# Patient Record
Sex: FEMALE | Race: WHITE | Employment: OTHER | URBAN - METROPOLITAN AREA
[De-identification: names, ages, dates, MRNs, and addresses within clinical notes are randomized per-mention and may not be internally consistent; named-entity substitution may affect disease eponyms.]

---

## 2019-01-23 ENCOUNTER — OFFICE VISIT (OUTPATIENT)
Dept: FAMILY MEDICINE CLINIC | Age: 34
End: 2019-01-23

## 2019-01-23 VITALS
WEIGHT: 273.6 LBS | OXYGEN SATURATION: 97 % | TEMPERATURE: 98.4 F | HEIGHT: 64 IN | SYSTOLIC BLOOD PRESSURE: 136 MMHG | BODY MASS INDEX: 46.71 KG/M2 | DIASTOLIC BLOOD PRESSURE: 84 MMHG | HEART RATE: 105 BPM

## 2019-01-23 DIAGNOSIS — Z76.89 ESTABLISHING CARE WITH NEW DOCTOR, ENCOUNTER FOR: ICD-10-CM

## 2019-01-23 DIAGNOSIS — M25.561 CHRONIC PAIN OF RIGHT KNEE: Primary | ICD-10-CM

## 2019-01-23 DIAGNOSIS — Z13.220 SCREENING, LIPID: ICD-10-CM

## 2019-01-23 DIAGNOSIS — E66.01 CLASS 3 SEVERE OBESITY DUE TO EXCESS CALORIES WITHOUT SERIOUS COMORBIDITY WITH BODY MASS INDEX (BMI) OF 45.0 TO 49.9 IN ADULT (HCC): ICD-10-CM

## 2019-01-23 DIAGNOSIS — Z13.31 POSITIVE DEPRESSION SCREENING: ICD-10-CM

## 2019-01-23 DIAGNOSIS — R20.0 FINGER NUMBNESS: ICD-10-CM

## 2019-01-23 DIAGNOSIS — R51.9 NONINTRACTABLE HEADACHE, UNSPECIFIED CHRONICITY PATTERN, UNSPECIFIED HEADACHE TYPE: ICD-10-CM

## 2019-01-23 DIAGNOSIS — G89.29 CHRONIC PAIN OF RIGHT KNEE: Primary | ICD-10-CM

## 2019-01-23 DIAGNOSIS — Z80.0 FAMILY HISTORY OF COLON CANCER: ICD-10-CM

## 2019-01-23 DIAGNOSIS — Z82.49 FAMILY HISTORY OF EARLY CAD: ICD-10-CM

## 2019-01-23 DIAGNOSIS — F32.9 CURRENT EPISODE OF MAJOR DEPRESSIVE DISORDER WITHOUT PRIOR EPISODE, UNSPECIFIED DEPRESSION EPISODE SEVERITY: ICD-10-CM

## 2019-01-23 DIAGNOSIS — F17.200 SMOKER: ICD-10-CM

## 2019-01-23 LAB
A/G RATIO: 1.7 (ref 1.1–2.2)
ALBUMIN SERPL-MCNC: 4.5 G/DL (ref 3.4–5)
ALP BLD-CCNC: 63 U/L (ref 40–129)
ALT SERPL-CCNC: 24 U/L (ref 10–40)
ANION GAP SERPL CALCULATED.3IONS-SCNC: 17 MMOL/L (ref 3–16)
AST SERPL-CCNC: 17 U/L (ref 15–37)
BASOPHILS ABSOLUTE: 0.1 K/UL (ref 0–0.2)
BASOPHILS RELATIVE PERCENT: 0.6 %
BILIRUB SERPL-MCNC: <0.2 MG/DL (ref 0–1)
BUN BLDV-MCNC: 10 MG/DL (ref 7–20)
CALCIUM SERPL-MCNC: 9.4 MG/DL (ref 8.3–10.6)
CHLORIDE BLD-SCNC: 103 MMOL/L (ref 99–110)
CHOLESTEROL, TOTAL: 234 MG/DL (ref 0–199)
CO2: 22 MMOL/L (ref 21–32)
CREAT SERPL-MCNC: 0.7 MG/DL (ref 0.6–1.1)
EOSINOPHILS ABSOLUTE: 0.3 K/UL (ref 0–0.6)
EOSINOPHILS RELATIVE PERCENT: 3.1 %
FOLATE: 12.93 NG/ML (ref 4.78–24.2)
GFR AFRICAN AMERICAN: >60
GFR NON-AFRICAN AMERICAN: >60
GLOBULIN: 2.6 G/DL
GLUCOSE BLD-MCNC: 125 MG/DL (ref 70–99)
HCT VFR BLD CALC: 45.2 % (ref 36–48)
HDLC SERPL-MCNC: 38 MG/DL (ref 40–60)
HEMOGLOBIN: 15.2 G/DL (ref 12–16)
LDL CHOLESTEROL CALCULATED: 172 MG/DL
LYMPHOCYTES ABSOLUTE: 2.6 K/UL (ref 1–5.1)
LYMPHOCYTES RELATIVE PERCENT: 29 %
MCH RBC QN AUTO: 31.8 PG (ref 26–34)
MCHC RBC AUTO-ENTMCNC: 33.7 G/DL (ref 31–36)
MCV RBC AUTO: 94.6 FL (ref 80–100)
MONOCYTES ABSOLUTE: 0.7 K/UL (ref 0–1.3)
MONOCYTES RELATIVE PERCENT: 7.9 %
NEUTROPHILS ABSOLUTE: 5.3 K/UL (ref 1.7–7.7)
NEUTROPHILS RELATIVE PERCENT: 59.4 %
PDW BLD-RTO: 14.2 % (ref 12.4–15.4)
PLATELET # BLD: 311 K/UL (ref 135–450)
PMV BLD AUTO: 8.6 FL (ref 5–10.5)
POTASSIUM SERPL-SCNC: 3.8 MMOL/L (ref 3.5–5.1)
RBC # BLD: 4.78 M/UL (ref 4–5.2)
SODIUM BLD-SCNC: 142 MMOL/L (ref 136–145)
TOTAL PROTEIN: 7.1 G/DL (ref 6.4–8.2)
TRIGL SERPL-MCNC: 118 MG/DL (ref 0–150)
TSH REFLEX: 0.7 UIU/ML (ref 0.27–4.2)
VITAMIN B-12: 720 PG/ML (ref 211–911)
VLDLC SERPL CALC-MCNC: 24 MG/DL
WBC # BLD: 9 K/UL (ref 4–11)

## 2019-01-23 PROCEDURE — 99204 OFFICE O/P NEW MOD 45 MIN: CPT | Performed by: FAMILY MEDICINE

## 2019-01-23 PROCEDURE — G8431 POS CLIN DEPRES SCRN F/U DOC: HCPCS | Performed by: FAMILY MEDICINE

## 2019-01-23 PROCEDURE — G0444 DEPRESSION SCREEN ANNUAL: HCPCS | Performed by: FAMILY MEDICINE

## 2019-01-23 PROCEDURE — 36415 COLL VENOUS BLD VENIPUNCTURE: CPT | Performed by: FAMILY MEDICINE

## 2019-01-23 RX ORDER — AMITRIPTYLINE HYDROCHLORIDE 25 MG/1
25 TABLET, FILM COATED ORAL NIGHTLY
Qty: 30 TABLET | Refills: 0 | Status: SHIPPED | OUTPATIENT
Start: 2019-01-23 | End: 2020-09-13

## 2019-01-23 RX ORDER — FLUOXETINE HYDROCHLORIDE 20 MG/1
20 CAPSULE ORAL DAILY
Qty: 30 CAPSULE | Refills: 0 | Status: SHIPPED | OUTPATIENT
Start: 2019-01-23 | End: 2019-01-27 | Stop reason: SDUPTHER

## 2019-01-23 ASSESSMENT — PATIENT HEALTH QUESTIONNAIRE - PHQ9
SUM OF ALL RESPONSES TO PHQ QUESTIONS 1-9: 16
9. THOUGHTS THAT YOU WOULD BE BETTER OFF DEAD, OR OF HURTING YOURSELF: 0
7. TROUBLE CONCENTRATING ON THINGS, SUCH AS READING THE NEWSPAPER OR WATCHING TELEVISION: 2
SUM OF ALL RESPONSES TO PHQ QUESTIONS 1-9: 16
4. FEELING TIRED OR HAVING LITTLE ENERGY: 2
6. FEELING BAD ABOUT YOURSELF - OR THAT YOU ARE A FAILURE OR HAVE LET YOURSELF OR YOUR FAMILY DOWN: 2
1. LITTLE INTEREST OR PLEASURE IN DOING THINGS: 2
SUM OF ALL RESPONSES TO PHQ9 QUESTIONS 1 & 2: 4
5. POOR APPETITE OR OVEREATING: 2
2. FEELING DOWN, DEPRESSED OR HOPELESS: 2
10. IF YOU CHECKED OFF ANY PROBLEMS, HOW DIFFICULT HAVE THESE PROBLEMS MADE IT FOR YOU TO DO YOUR WORK, TAKE CARE OF THINGS AT HOME, OR GET ALONG WITH OTHER PEOPLE: 1
3. TROUBLE FALLING OR STAYING ASLEEP: 2
8. MOVING OR SPEAKING SO SLOWLY THAT OTHER PEOPLE COULD HAVE NOTICED. OR THE OPPOSITE, BEING SO FIGETY OR RESTLESS THAT YOU HAVE BEEN MOVING AROUND A LOT MORE THAN USUAL: 2

## 2019-01-23 ASSESSMENT — ENCOUNTER SYMPTOMS
CONSTIPATION: 0
DIARRHEA: 0
CHEST TIGHTNESS: 1
SHORTNESS OF BREATH: 1
BLOOD IN STOOL: 0

## 2019-01-27 DIAGNOSIS — F32.9 CURRENT EPISODE OF MAJOR DEPRESSIVE DISORDER WITHOUT PRIOR EPISODE, UNSPECIFIED DEPRESSION EPISODE SEVERITY: ICD-10-CM

## 2019-01-27 RX ORDER — FLUOXETINE HYDROCHLORIDE 20 MG/1
20 CAPSULE ORAL DAILY
Qty: 30 CAPSULE | Refills: 0 | Status: SHIPPED | OUTPATIENT
Start: 2019-01-27 | End: 2019-02-06 | Stop reason: SDUPTHER

## 2019-02-06 ENCOUNTER — OFFICE VISIT (OUTPATIENT)
Dept: FAMILY MEDICINE CLINIC | Age: 34
End: 2019-02-06
Payer: MEDICAID

## 2019-02-06 VITALS
TEMPERATURE: 97.2 F | DIASTOLIC BLOOD PRESSURE: 82 MMHG | SYSTOLIC BLOOD PRESSURE: 134 MMHG | HEART RATE: 88 BPM | BODY MASS INDEX: 46.14 KG/M2 | WEIGHT: 268.8 LBS | OXYGEN SATURATION: 98 %

## 2019-02-06 DIAGNOSIS — R73.9 HYPERGLYCEMIA: Primary | ICD-10-CM

## 2019-02-06 DIAGNOSIS — R51.9 NONINTRACTABLE HEADACHE, UNSPECIFIED CHRONICITY PATTERN, UNSPECIFIED HEADACHE TYPE: ICD-10-CM

## 2019-02-06 DIAGNOSIS — Z82.49 FAMILY HISTORY OF EARLY CAD: ICD-10-CM

## 2019-02-06 DIAGNOSIS — E78.2 MIXED HYPERLIPIDEMIA: ICD-10-CM

## 2019-02-06 DIAGNOSIS — F32.9 CURRENT EPISODE OF MAJOR DEPRESSIVE DISORDER WITHOUT PRIOR EPISODE, UNSPECIFIED DEPRESSION EPISODE SEVERITY: ICD-10-CM

## 2019-02-06 LAB — HBA1C MFR BLD: 5.2 %

## 2019-02-06 PROCEDURE — G8417 CALC BMI ABV UP PARAM F/U: HCPCS | Performed by: FAMILY MEDICINE

## 2019-02-06 PROCEDURE — 99214 OFFICE O/P EST MOD 30 MIN: CPT | Performed by: FAMILY MEDICINE

## 2019-02-06 PROCEDURE — 83036 HEMOGLOBIN GLYCOSYLATED A1C: CPT | Performed by: FAMILY MEDICINE

## 2019-02-06 PROCEDURE — G8427 DOCREV CUR MEDS BY ELIG CLIN: HCPCS | Performed by: FAMILY MEDICINE

## 2019-02-06 PROCEDURE — 4004F PT TOBACCO SCREEN RCVD TLK: CPT | Performed by: FAMILY MEDICINE

## 2019-02-06 PROCEDURE — G8484 FLU IMMUNIZE NO ADMIN: HCPCS | Performed by: FAMILY MEDICINE

## 2019-02-06 RX ORDER — FLUOXETINE HYDROCHLORIDE 20 MG/1
20 CAPSULE ORAL DAILY
Qty: 30 CAPSULE | Refills: 0 | Status: SHIPPED | OUTPATIENT
Start: 2019-02-06 | End: 2019-03-22 | Stop reason: SDUPTHER

## 2019-02-06 ASSESSMENT — ENCOUNTER SYMPTOMS: SHORTNESS OF BREATH: 0

## 2019-02-18 ENCOUNTER — TELEPHONE (OUTPATIENT)
Dept: GASTROENTEROLOGY | Age: 34
End: 2019-02-18

## 2019-03-12 ENCOUNTER — OFFICE VISIT (OUTPATIENT)
Dept: GASTROENTEROLOGY | Age: 34
End: 2019-03-12
Payer: MEDICAID

## 2019-03-12 VITALS
OXYGEN SATURATION: 97 % | HEART RATE: 84 BPM | HEIGHT: 64 IN | DIASTOLIC BLOOD PRESSURE: 72 MMHG | SYSTOLIC BLOOD PRESSURE: 112 MMHG | WEIGHT: 277 LBS | BODY MASS INDEX: 47.29 KG/M2

## 2019-03-12 DIAGNOSIS — Z80.0 FAMILY HISTORY OF COLON CANCER IN MOTHER: ICD-10-CM

## 2019-03-12 DIAGNOSIS — K92.1 BLOOD IN STOOL: Primary | ICD-10-CM

## 2019-03-12 PROCEDURE — 99204 OFFICE O/P NEW MOD 45 MIN: CPT | Performed by: NURSE PRACTITIONER

## 2019-03-12 PROCEDURE — G8427 DOCREV CUR MEDS BY ELIG CLIN: HCPCS | Performed by: NURSE PRACTITIONER

## 2019-03-12 PROCEDURE — G8417 CALC BMI ABV UP PARAM F/U: HCPCS | Performed by: NURSE PRACTITIONER

## 2019-03-12 PROCEDURE — G8484 FLU IMMUNIZE NO ADMIN: HCPCS | Performed by: NURSE PRACTITIONER

## 2019-03-12 PROCEDURE — 4004F PT TOBACCO SCREEN RCVD TLK: CPT | Performed by: NURSE PRACTITIONER

## 2019-03-12 ASSESSMENT — ENCOUNTER SYMPTOMS
WHEEZING: 0
NAUSEA: 0
COLOR CHANGE: 0
VOMITING: 0
ABDOMINAL PAIN: 0
BLOOD IN STOOL: 1
SHORTNESS OF BREATH: 0
COUGH: 1
BACK PAIN: 0
CONSTIPATION: 0
EYE PAIN: 0
DIARRHEA: 0

## 2019-03-15 ENCOUNTER — TELEPHONE (OUTPATIENT)
Dept: GASTROENTEROLOGY | Age: 34
End: 2019-03-15

## 2019-03-18 ENCOUNTER — TELEPHONE (OUTPATIENT)
Dept: GASTROENTEROLOGY | Age: 34
End: 2019-03-18

## 2019-03-18 ENCOUNTER — ANESTHESIA EVENT (OUTPATIENT)
Dept: OPERATING ROOM | Age: 34
End: 2019-03-18
Payer: MEDICAID

## 2019-03-18 ASSESSMENT — LIFESTYLE VARIABLES: SMOKING_STATUS: 1

## 2019-03-19 ENCOUNTER — ANESTHESIA (OUTPATIENT)
Dept: OPERATING ROOM | Age: 34
End: 2019-03-19
Payer: MEDICAID

## 2019-03-19 ENCOUNTER — TELEPHONE (OUTPATIENT)
Dept: GASTROENTEROLOGY | Age: 34
End: 2019-03-19

## 2019-03-19 ENCOUNTER — HOSPITAL ENCOUNTER (OUTPATIENT)
Age: 34
Setting detail: OUTPATIENT SURGERY
Discharge: HOME | End: 2019-03-19
Attending: INTERNAL MEDICINE | Admitting: INTERNAL MEDICINE
Payer: MEDICAID

## 2019-03-19 VITALS — OXYGEN SATURATION: 96 % | DIASTOLIC BLOOD PRESSURE: 66 MMHG | SYSTOLIC BLOOD PRESSURE: 115 MMHG

## 2019-03-19 VITALS
HEART RATE: 83 BPM | SYSTOLIC BLOOD PRESSURE: 119 MMHG | BODY MASS INDEX: 46.61 KG/M2 | TEMPERATURE: 98.4 F | RESPIRATION RATE: 16 BRPM | DIASTOLIC BLOOD PRESSURE: 86 MMHG | OXYGEN SATURATION: 100 % | WEIGHT: 273 LBS | HEIGHT: 64 IN

## 2019-03-19 PROBLEM — K62.5 GASTROINTESTINAL HEMORRHAGE ASSOCIATED WITH ANORECTAL SOURCE: Status: ACTIVE | Noted: 2019-03-19

## 2019-03-19 LAB
PREGNANCY TEST URINE, POC: NEGATIVE
PREGNANCY TEST URINE, POC: NORMAL

## 2019-03-19 PROCEDURE — 45385 COLONOSCOPY W/LESION REMOVAL: CPT | Performed by: INTERNAL MEDICINE

## 2019-03-19 PROCEDURE — 2580000003 HC RX 258: Performed by: INTERNAL MEDICINE

## 2019-03-19 PROCEDURE — 7100000011 HC PHASE II RECOVERY - ADDTL 15 MIN: Performed by: INTERNAL MEDICINE

## 2019-03-19 PROCEDURE — 3609009900 HC COLONOSCOPY W/CONTROL BLEEDING ANY METHOD: Performed by: INTERNAL MEDICINE

## 2019-03-19 PROCEDURE — 88305 TISSUE EXAM BY PATHOLOGIST: CPT

## 2019-03-19 PROCEDURE — 7100000010 HC PHASE II RECOVERY - FIRST 15 MIN: Performed by: INTERNAL MEDICINE

## 2019-03-19 PROCEDURE — 2500000003 HC RX 250 WO HCPCS: Performed by: NURSE ANESTHETIST, CERTIFIED REGISTERED

## 2019-03-19 PROCEDURE — 81025 URINE PREGNANCY TEST: CPT

## 2019-03-19 PROCEDURE — 2720000010 HC SURG SUPPLY STERILE: Performed by: INTERNAL MEDICINE

## 2019-03-19 PROCEDURE — 3700000000 HC ANESTHESIA ATTENDED CARE: Performed by: INTERNAL MEDICINE

## 2019-03-19 PROCEDURE — 6360000002 HC RX W HCPCS: Performed by: NURSE ANESTHETIST, CERTIFIED REGISTERED

## 2019-03-19 PROCEDURE — 2709999900 HC NON-CHARGEABLE SUPPLY: Performed by: INTERNAL MEDICINE

## 2019-03-19 PROCEDURE — 3700000001 HC ADD 15 MINUTES (ANESTHESIA): Performed by: INTERNAL MEDICINE

## 2019-03-19 RX ORDER — LIDOCAINE HYDROCHLORIDE 20 MG/ML
INJECTION, SOLUTION EPIDURAL; INFILTRATION; INTRACAUDAL; PERINEURAL PRN
Status: DISCONTINUED | OUTPATIENT
Start: 2019-03-19 | End: 2019-03-19 | Stop reason: SDUPTHER

## 2019-03-19 RX ORDER — PROPOFOL 10 MG/ML
INJECTION, EMULSION INTRAVENOUS PRN
Status: DISCONTINUED | OUTPATIENT
Start: 2019-03-19 | End: 2019-03-19 | Stop reason: SDUPTHER

## 2019-03-19 RX ORDER — SODIUM CHLORIDE, SODIUM LACTATE, POTASSIUM CHLORIDE, CALCIUM CHLORIDE 600; 310; 30; 20 MG/100ML; MG/100ML; MG/100ML; MG/100ML
INJECTION, SOLUTION INTRAVENOUS CONTINUOUS
Status: DISCONTINUED | OUTPATIENT
Start: 2019-03-19 | End: 2019-03-19 | Stop reason: HOSPADM

## 2019-03-19 RX ADMIN — PROPOFOL 30 MG: 10 INJECTION, EMULSION INTRAVENOUS at 14:55

## 2019-03-19 RX ADMIN — PROPOFOL 30 MG: 10 INJECTION, EMULSION INTRAVENOUS at 14:59

## 2019-03-19 RX ADMIN — PROPOFOL 30 MG: 10 INJECTION, EMULSION INTRAVENOUS at 15:09

## 2019-03-19 RX ADMIN — PROPOFOL 30 MG: 10 INJECTION, EMULSION INTRAVENOUS at 15:11

## 2019-03-19 RX ADMIN — PROPOFOL 30 MG: 10 INJECTION, EMULSION INTRAVENOUS at 14:57

## 2019-03-19 RX ADMIN — PROPOFOL 30 MG: 10 INJECTION, EMULSION INTRAVENOUS at 14:51

## 2019-03-19 RX ADMIN — PROPOFOL 30 MG: 10 INJECTION, EMULSION INTRAVENOUS at 15:01

## 2019-03-19 RX ADMIN — PROPOFOL 30 MG: 10 INJECTION, EMULSION INTRAVENOUS at 15:15

## 2019-03-19 RX ADMIN — LIDOCAINE HYDROCHLORIDE 100 MG: 20 INJECTION, SOLUTION EPIDURAL; INFILTRATION; INTRACAUDAL; PERINEURAL at 14:49

## 2019-03-19 RX ADMIN — PROPOFOL 30 MG: 10 INJECTION, EMULSION INTRAVENOUS at 15:03

## 2019-03-19 RX ADMIN — PROPOFOL 30 MG: 10 INJECTION, EMULSION INTRAVENOUS at 15:07

## 2019-03-19 RX ADMIN — PROPOFOL 30 MG: 10 INJECTION, EMULSION INTRAVENOUS at 15:13

## 2019-03-19 RX ADMIN — PROPOFOL 50 MG: 10 INJECTION, EMULSION INTRAVENOUS at 14:49

## 2019-03-19 RX ADMIN — SODIUM CHLORIDE, POTASSIUM CHLORIDE, SODIUM LACTATE AND CALCIUM CHLORIDE: 600; 310; 30; 20 INJECTION, SOLUTION INTRAVENOUS at 13:48

## 2019-03-19 RX ADMIN — PROPOFOL 30 MG: 10 INJECTION, EMULSION INTRAVENOUS at 14:53

## 2019-03-19 RX ADMIN — PROPOFOL 30 MG: 10 INJECTION, EMULSION INTRAVENOUS at 15:05

## 2019-03-19 ASSESSMENT — PAIN SCALES - GENERAL
PAINLEVEL_OUTOF10: 0
PAINLEVEL_OUTOF10: 0

## 2019-03-19 ASSESSMENT — PAIN - FUNCTIONAL ASSESSMENT: PAIN_FUNCTIONAL_ASSESSMENT: 0-10

## 2019-03-22 ENCOUNTER — OFFICE VISIT (OUTPATIENT)
Dept: FAMILY MEDICINE CLINIC | Age: 34
End: 2019-03-22
Payer: MEDICAID

## 2019-03-22 VITALS
BODY MASS INDEX: 47.38 KG/M2 | OXYGEN SATURATION: 98 % | TEMPERATURE: 98.6 F | HEART RATE: 81 BPM | DIASTOLIC BLOOD PRESSURE: 60 MMHG | SYSTOLIC BLOOD PRESSURE: 112 MMHG | WEIGHT: 276 LBS

## 2019-03-22 DIAGNOSIS — G47.00 INSOMNIA, UNSPECIFIED TYPE: ICD-10-CM

## 2019-03-22 DIAGNOSIS — F32.9 CURRENT EPISODE OF MAJOR DEPRESSIVE DISORDER WITHOUT PRIOR EPISODE, UNSPECIFIED DEPRESSION EPISODE SEVERITY: ICD-10-CM

## 2019-03-22 DIAGNOSIS — E78.2 MIXED HYPERLIPIDEMIA: Primary | ICD-10-CM

## 2019-03-22 PROCEDURE — G8484 FLU IMMUNIZE NO ADMIN: HCPCS | Performed by: FAMILY MEDICINE

## 2019-03-22 PROCEDURE — G8427 DOCREV CUR MEDS BY ELIG CLIN: HCPCS | Performed by: FAMILY MEDICINE

## 2019-03-22 PROCEDURE — G8417 CALC BMI ABV UP PARAM F/U: HCPCS | Performed by: FAMILY MEDICINE

## 2019-03-22 PROCEDURE — 99213 OFFICE O/P EST LOW 20 MIN: CPT | Performed by: FAMILY MEDICINE

## 2019-03-22 PROCEDURE — 4004F PT TOBACCO SCREEN RCVD TLK: CPT | Performed by: FAMILY MEDICINE

## 2019-03-22 RX ORDER — FLUOXETINE HYDROCHLORIDE 20 MG/1
20 CAPSULE ORAL DAILY
Qty: 30 CAPSULE | Refills: 2 | Status: SHIPPED | OUTPATIENT
Start: 2019-03-22 | End: 2019-06-20 | Stop reason: SDUPTHER

## 2019-03-22 ASSESSMENT — ENCOUNTER SYMPTOMS: SHORTNESS OF BREATH: 0

## 2019-06-19 ENCOUNTER — HOSPITAL ENCOUNTER (EMERGENCY)
Age: 34
Discharge: HOME | End: 2019-06-19
Payer: MEDICAID

## 2019-06-19 VITALS
SYSTOLIC BLOOD PRESSURE: 127 MMHG | BODY MASS INDEX: 47.12 KG/M2 | DIASTOLIC BLOOD PRESSURE: 79 MMHG | OXYGEN SATURATION: 98 % | WEIGHT: 276 LBS | HEIGHT: 64 IN | TEMPERATURE: 98.3 F | RESPIRATION RATE: 18 BRPM | HEART RATE: 80 BPM

## 2019-06-19 DIAGNOSIS — M25.552 LEFT HIP PAIN: Primary | ICD-10-CM

## 2019-06-19 PROCEDURE — 96372 THER/PROPH/DIAG INJ SC/IM: CPT

## 2019-06-19 PROCEDURE — 6370000000 HC RX 637 (ALT 250 FOR IP): Performed by: PHYSICIAN ASSISTANT

## 2019-06-19 PROCEDURE — 6360000002 HC RX W HCPCS: Performed by: PHYSICIAN ASSISTANT

## 2019-06-19 PROCEDURE — 99283 EMERGENCY DEPT VISIT LOW MDM: CPT

## 2019-06-19 RX ORDER — KETOROLAC TROMETHAMINE 30 MG/ML
30 INJECTION, SOLUTION INTRAMUSCULAR; INTRAVENOUS ONCE
Status: COMPLETED | OUTPATIENT
Start: 2019-06-19 | End: 2019-06-19

## 2019-06-19 RX ORDER — NAPROXEN 500 MG/1
500 TABLET ORAL 2 TIMES DAILY PRN
Qty: 30 TABLET | Refills: 0 | Status: SHIPPED | OUTPATIENT
Start: 2019-06-19 | End: 2020-01-17

## 2019-06-19 RX ORDER — LIDOCAINE 4 G/G
1 PATCH TOPICAL DAILY
Qty: 30 PATCH | Refills: 0 | Status: SHIPPED | OUTPATIENT
Start: 2019-06-19 | End: 2019-07-19

## 2019-06-19 RX ORDER — LIDOCAINE 4 G/G
1 PATCH TOPICAL ONCE
Status: DISCONTINUED | OUTPATIENT
Start: 2019-06-19 | End: 2019-06-19 | Stop reason: HOSPADM

## 2019-06-19 RX ORDER — CYCLOBENZAPRINE HCL 10 MG
10 TABLET ORAL 3 TIMES DAILY PRN
Qty: 15 TABLET | Refills: 0 | Status: SHIPPED | OUTPATIENT
Start: 2019-06-19 | End: 2019-06-29

## 2019-06-19 RX ADMIN — KETOROLAC TROMETHAMINE 30 MG: 30 INJECTION, SOLUTION INTRAMUSCULAR; INTRAVENOUS at 19:28

## 2019-06-19 ASSESSMENT — PAIN DESCRIPTION - LOCATION: LOCATION: HIP

## 2019-06-19 ASSESSMENT — PAIN DESCRIPTION - DESCRIPTORS: DESCRIPTORS: PRESSURE;THROBBING

## 2019-06-19 ASSESSMENT — PAIN DESCRIPTION - ORIENTATION: ORIENTATION: LEFT

## 2019-06-19 ASSESSMENT — PAIN DESCRIPTION - PROGRESSION: CLINICAL_PROGRESSION: GRADUALLY WORSENING

## 2019-06-19 ASSESSMENT — PAIN SCALES - GENERAL: PAINLEVEL_OUTOF10: 7

## 2019-06-19 ASSESSMENT — PAIN DESCRIPTION - PAIN TYPE: TYPE: ACUTE PAIN

## 2019-06-19 ASSESSMENT — PAIN DESCRIPTION - FREQUENCY: FREQUENCY: CONTINUOUS

## 2019-06-19 NOTE — ED PROVIDER NOTES
eMERGENCY dEPARTMENT eNCOUnter      PCP: Romana White MD    [facility]    Chief Complaint   Patient presents with    Hip Pain     left pain, cannot bear weight, no injury        HPI    Declan Evans is a 27 y.o. female who presents with acute on chronic left hip pain. Has chronic history of years of hip pain states from time to time it flares up like this. Very painful to walk. Patient is never seen an orthopedist for this. Denies any new injury or trauma. Patient states normally she comes in and get some medication it seems to help symptoms and then she is able to handle it. Denies any new symptoms past.  Denies any fevers or chills no chest pain or shortness of breath. No low back pain. No nausea vomiting no pain down the leg no swelling no numbness tingling. No history of DVT or PE. The pain worsens with movement. No known alleviating factors. REVIEW OF SYSTEMS    General:   Denies fever, weight loss or weakness. Denies recent/current systemic infection, recent spinal fracture or procedure, or immunosuppression. Denies history of IVDA. ENT:  No upper respiratory symptoms  Neck:  See HPI  Cardiovascular:  Denies chest pain, palpitations or swelling  Respiratory:  Denies cough or shortness of breath    GI:  Denies abdominal pain, nausea, vomiting, or diarrhea  :  Denies any urinary symptoms (including incontinence or retention) or  vaginal symptoms. Denies pregnancy. No recent or current indwelling urinary catheter  Musculoskeletal:  + left hip pain. No upper or lower extremity pain or functional deficits. No numbness or tingling. Skin:  Denies rash  Neurologic:   No bowel incontinence or bladder retention, No saddle anesthesia. No radicular symptoms. No lower extremity weakness or altered sensation.   Endocrine:  Denies polyuria or polydypsia   Lymphatic:  Denies swollen glands     All other review of systems are negative  See HPI and nursing notes for additional information       PAST MEDICAL & SURGICAL HISTORY    Past Medical History:   Diagnosis Date    Asthma     as a child    Shock ([facility] [address]     post partum     Past Surgical History:   Procedure Laterality Date     SECTION, LOW TRANSVERSE  , 2014    COLONOSCOPY  2019    polyps x5, Internal grade 1 hemorrhoids    COLONOSCOPY N/A 3/19/2019    COLONOSCOPY CONTROL HEMORRHAGE with polypectomy performed by Amilcar Monet MD at [place] [address]      3-4    HEMORRHOID SURGERY         CURRENT MEDICATIONS    Current Outpatient Rx   Medication Sig Dispense Refill    lidocaine 4 % external patch Place 1 patch onto the skin daily 30 patch 0    naproxen (NAPROSYN) 500 MG tablet Take 1 tablet by mouth 2 times daily as needed for Pain 30 tablet 0    cyclobenzaprine (FLEXERIL) 10 MG tablet Take 1 tablet by mouth 3 times daily as needed for Muscle spasms 15 tablet 0    FLUoxetine (PROZAC) 20 MG capsule Take 1 capsule by mouth daily 30 capsule 2    bisacodyl (BISACODYL) 5 MG EC tablet Take 4 tablets once for colonoscopy prep 4 tablet 0    amitriptyline (ELAVIL) 25 MG tablet Take 1 tablet by mouth nightly 30 tablet 0       ALLERGIES    No Known Allergies    SOCIAL HISTORY    Social History     Socioeconomic History    Marital status: Legally      Spouse name: Not on file    Number of children: Not on file    Years of education: Not on file    Highest education level: Not on file   Occupational History    Not on file   Social Needs    Financial resource strain: Not on file    Food insecurity:     Worry: Not on file     Inability: Not on file    Transportation needs:     Medical: Not on file     Non-medical: Not on file   Tobacco Use    Smoking status: Current Every Day Smoker     Packs/day: 1.00     Years: 15.00     Pack years: 15.00     Types: Cigarettes    Smokeless tobacco: Never Used   Substance and Sexual Activity    Alcohol use: No    Drug use: No    Sexual activity: Yes     Partners: Male   Lifestyle    Physical activity:     Days per week: Not on file     Minutes per session: Not on file    Stress: Not on file   Relationships    Social connections:     Talks on phone: Not on file     Gets together: Not on file     Attends [employer]ism service: Not on file     Active member of club or organization: Not on file     Attends meetings of clubs or organizations: Not on file     Relationship status: Not on file    Intimate partner violence:     Fear of current or ex partner: Not on file     Emotionally abused: Not on file     Physically abused: Not on file     Forced sexual activity: Not on file   Other Topics Concern    Not on file   Social History Narrative    Not on file       PHYSICAL EXAM    VITAL SIGNS: /79   Pulse 80   Temp 98.3 °F (36.8 °C) (Oral)   Resp 18   Ht 5' 4\" (1.626 m)   Wt 276 lb (125.2 kg)   LMP 05/12/2019 (Approximate)   SpO2 98%   BMI 47.38 kg/m²    Patient was noted to be afebrile    Constitutional:  Well developed, well nourished. HENT:  Atraumatic,  Moist mucus membranes. Eyes:  No orbital trauma. No scleral icterus. Neck: No JVD, supple. No enlarged lymph nodes. Cardiovascular:  Normal rate, regular rhythm, no murmurs/rubs/gallops  Respiratory:  No respiratory distress, normal breath sounds. Abdomen: Bowel sounds normal, Soft, No tenderness, no masses. Musculoskeletal:     No lower extremity swelling, discoloration, focal tenderness, palpable cord. Fanny's sign negative    Neuro left hip and buttocks tenderness to palpation on the left side no tenderness on right. Seems to be over the lateral hip joint and into the buttocks. No discoloration bruising crepitus. Full range of motion of this hip. Soft compartments without tenderness down the leg, no knee or ankle tenderness. DP and PT pulses intact. Distally neurovascular intact. Integument:  Well hydrated, no rash, no pallor.       Back:   - No gross deformity, swelling, or discoloration.    - No  lumbar  tenderness. No masses, fluctuance, warmth, or skin changes.  - No localized midline bony tenderness.   - No change in pain with forward flexion  - SLR test negative     No CVA tenderness to percussion      Neurologic:    No obvious neurological deficits. Patient moves without obvious difficulty or weakness. HIGH sensitivity Neuro Exam for Lumbar spine  -(L1-L2)  inner thigh sensation intact  -(S3,4,5) Groin sensation intact     -Lower extremity ROM intact including:       -(L2) cross legs (adduct thighs)        -(L3) extend knees & flex knees (S1)       -(L4) dorsiflex ankles       -(L5) point great toes upward & plantar flex toes (S2)        -(L2,3,4) Knee reflexes intact bilaterally      Vascular:    Femoral & Distal pulses, & capillary refill intact bilateral lower extremities            ED COURSE & MEDICAL DECISION MAKING              Presents as above. Is afebrile 98% on room air. Not tachycardic. Has been seen here for similar symptoms multiple times. Has had x-ray imaging in the past.  There was no evidence of acute arthropathy 2 years in a row. Discussed with patient without any new injury or trauma I do not think that further imaging would really be beneficial or change systematic management. Patient comfortable with this plan. She would just like symptomatic treatment. She is afebrile nontoxic-appearing has full range of motion of this joint. Low suspicion for septic arthritis, compartment syndrome, DVT or acute spinal cause of patient's symptoms. We will treat symptomatically. Patient has no concern for pregnancy. Recommend follow-up with PCP, patient has an appointment to be seen tomorrow to discuss possible need to follow-up with orthopedist, consult provided for further evaluation of this exacerbation of hip pain from time to time, consideration of things such as arthritis, bursitis or other acute cause of symptoms.   We did discuss signs and symptoms that would warrant more emergent return to the ED for reevaluation and patient comfortable with this work-up and plan. Based on Pt's history (including stratification of the above red flags) & physical exam findings today, I do not see evidence of spinal cord compression/focal neuro deficits, cauda equina syndrome, epidural abscess, or osteomyelitis on exam today. History and exam is consistent with musculoskeletal back pain - Symptomatic treatment provided today. I discussed stretching exercises and avoid vigorous activity today at bedside. I recommend supportive OTC back brace for the next several days. I recommend followup with primary care provider over the next several days for recheck. Patient agrees to return emergency department if symptoms worsen or any new symptoms develop - this was discussed in detail at beside with Pt. Vital signs and nursing notes reviewed during ED course. I have independently evaluated this patient . Supervising MD present in the Emergency Department, available for consultation, throughout entirety of  patient care. Clinical  IMPRESSION    1. Left hip pain                Comment: Please note this report has been produced using speech recognition software and may contain errors related to that system including errors in grammar, punctuation, and spelling, as well as words and phrases that may be inappropriate. If there are any questions or concerns please feel free to contact the dictating provider for clarification.        Ashley Rachel PA-C  06/19/19 [id number]

## 2019-06-20 ENCOUNTER — OFFICE VISIT (OUTPATIENT)
Dept: FAMILY MEDICINE CLINIC | Age: 34
End: 2019-06-20
Payer: MEDICAID

## 2019-06-20 VITALS
WEIGHT: 293 LBS | OXYGEN SATURATION: 98 % | BODY MASS INDEX: 50.5 KG/M2 | TEMPERATURE: 96.7 F | HEART RATE: 87 BPM | DIASTOLIC BLOOD PRESSURE: 80 MMHG | SYSTOLIC BLOOD PRESSURE: 136 MMHG

## 2019-06-20 DIAGNOSIS — F32.9 CURRENT EPISODE OF MAJOR DEPRESSIVE DISORDER WITHOUT PRIOR EPISODE, UNSPECIFIED DEPRESSION EPISODE SEVERITY: ICD-10-CM

## 2019-06-20 DIAGNOSIS — M25.552 ACUTE HIP PAIN, LEFT: Primary | ICD-10-CM

## 2019-06-20 PROCEDURE — 4004F PT TOBACCO SCREEN RCVD TLK: CPT | Performed by: FAMILY MEDICINE

## 2019-06-20 PROCEDURE — G8427 DOCREV CUR MEDS BY ELIG CLIN: HCPCS | Performed by: FAMILY MEDICINE

## 2019-06-20 PROCEDURE — G8417 CALC BMI ABV UP PARAM F/U: HCPCS | Performed by: FAMILY MEDICINE

## 2019-06-20 PROCEDURE — 99213 OFFICE O/P EST LOW 20 MIN: CPT | Performed by: FAMILY MEDICINE

## 2019-06-20 RX ORDER — FLUOXETINE HYDROCHLORIDE 20 MG/1
20 CAPSULE ORAL DAILY
Qty: 30 CAPSULE | Refills: 0 | Status: SHIPPED | OUTPATIENT
Start: 2019-06-20 | End: 2020-09-13

## 2019-06-20 NOTE — PROGRESS NOTES
activity:     Days per week: Not on file     Minutes per session: Not on file    Stress: Not on file   Relationships    Social connections:     Talks on phone: Not on file     Gets together: Not on file     Attends Congregation service: Not on file     Active member of club or organization: Not on file     Attends meetings of clubs or organizations: Not on file     Relationship status: Not on file    Intimate partner violence:     Fear of current or ex partner: Not on file     Emotionally abused: Not on file     Physically abused: Not on file     Forced sexual activity: Not on file   Other Topics Concern    Not on file   Social History Narrative    Not on file     Family History   Problem Relation Age of Onset    Colon Cancer Mother 45    Heart Attack Father          Objective:   Physical Exam   Constitutional: She is oriented to person, place, and time. She appears well-developed and well-nourished. She appears distressed (appears in pain). Musculoskeletal:        Left hip: She exhibits decreased range of motion, decreased strength and tenderness (no tenderness over lateral hip). She exhibits no swelling and no deformity. Legs:  Neurological: She is alert and oriented to person, place, and time. Psychiatric: She has a normal mood and affect. Nursing note and vitals reviewed. Assessment:       Diagnosis Orders   1. Acute hip pain, left  XR HIP LEFT (2-3 VIEWS)   2. Current episode of major depressive disorder without prior episode, unspecified depression episode severity  FLUoxetine (PROZAC) 20 MG capsule           Plan:      1. Check x ray given severity of pain without injury. Continue meds from ER. If x ray is non acute, will do steroid burst. She has contacted ortho. I suggest we hold off on that until we see if that is appropriate. 2. She is due for follow up on this now, will have her come back in 1 month to review since today's visit was acute and time consuming, and pt is in pain.

## 2019-10-23 ENCOUNTER — HOSPITAL ENCOUNTER (EMERGENCY)
Age: 34
Discharge: HOME | End: 2019-10-23
Payer: MEDICAID

## 2019-10-23 VITALS
BODY MASS INDEX: 40.12 KG/M2 | TEMPERATURE: 97.9 F | SYSTOLIC BLOOD PRESSURE: 137 MMHG | WEIGHT: 235 LBS | HEART RATE: 93 BPM | HEIGHT: 64 IN | DIASTOLIC BLOOD PRESSURE: 77 MMHG | RESPIRATION RATE: 20 BRPM | OXYGEN SATURATION: 96 %

## 2019-10-23 DIAGNOSIS — J06.9 UPPER RESPIRATORY INFECTION WITH COUGH AND CONGESTION: Primary | ICD-10-CM

## 2019-10-23 DIAGNOSIS — Z72.0 TOBACCO ABUSE: ICD-10-CM

## 2019-10-23 PROCEDURE — 6370000000 HC RX 637 (ALT 250 FOR IP): Performed by: PHYSICIAN ASSISTANT

## 2019-10-23 PROCEDURE — 94761 N-INVAS EAR/PLS OXIMETRY MLT: CPT

## 2019-10-23 PROCEDURE — 87081 CULTURE SCREEN ONLY: CPT

## 2019-10-23 PROCEDURE — 94640 AIRWAY INHALATION TREATMENT: CPT

## 2019-10-23 PROCEDURE — 99283 EMERGENCY DEPT VISIT LOW MDM: CPT

## 2019-10-23 PROCEDURE — 87430 STREP A AG IA: CPT

## 2019-10-23 RX ORDER — ALBUTEROL SULFATE 90 UG/1
2 AEROSOL, METERED RESPIRATORY (INHALATION) EVERY 4 HOURS PRN
Qty: 1 INHALER | Refills: 1 | Status: SHIPPED | OUTPATIENT
Start: 2019-10-23 | End: 2020-01-17

## 2019-10-23 RX ORDER — INHALER, ASSIST DEVICES
1 SPACER (EA) MISCELLANEOUS EVERY 6 HOURS
Qty: 1 EACH | Refills: 0 | Status: SHIPPED | OUTPATIENT
Start: 2019-10-23 | End: 2020-09-13

## 2019-10-23 RX ORDER — LIDOCAINE HYDROCHLORIDE 20 MG/ML
SOLUTION OROPHARYNGEAL
Qty: 200 ML | Refills: 0 | Status: SHIPPED | OUTPATIENT
Start: 2019-10-23 | End: 2020-09-13

## 2019-10-23 RX ORDER — PREDNISONE 10 MG/1
50 TABLET ORAL DAILY
Qty: 25 TABLET | Refills: 0 | Status: SHIPPED | OUTPATIENT
Start: 2019-10-23 | End: 2019-10-28

## 2019-10-23 RX ORDER — PREDNISONE 20 MG/1
60 TABLET ORAL ONCE
Status: COMPLETED | OUTPATIENT
Start: 2019-10-23 | End: 2019-10-23

## 2019-10-23 RX ORDER — IPRATROPIUM BROMIDE AND ALBUTEROL SULFATE 2.5; .5 MG/3ML; MG/3ML
1 SOLUTION RESPIRATORY (INHALATION) ONCE
Status: COMPLETED | OUTPATIENT
Start: 2019-10-23 | End: 2019-10-23

## 2019-10-23 RX ORDER — BENZONATATE 100 MG/1
100 CAPSULE ORAL 3 TIMES DAILY PRN
Qty: 10 CAPSULE | Refills: 0 | Status: SHIPPED | OUTPATIENT
Start: 2019-10-23 | End: 2019-10-30

## 2019-10-23 RX ORDER — GUAIFENESIN AND CODEINE PHOSPHATE 100; 10 MG/5ML; MG/5ML
5 SOLUTION ORAL ONCE
Status: COMPLETED | OUTPATIENT
Start: 2019-10-23 | End: 2019-10-23

## 2019-10-23 RX ADMIN — IPRATROPIUM BROMIDE AND ALBUTEROL SULFATE 1 AMPULE: .5; 3 SOLUTION RESPIRATORY (INHALATION) at 14:32

## 2019-10-23 RX ADMIN — Medication 5 ML: at 13:31

## 2019-10-23 RX ADMIN — PREDNISONE 60 MG: 20 TABLET ORAL at 13:31

## 2019-10-25 LAB
CULTURE: ABNORMAL
Lab: ABNORMAL
SPECIMEN: ABNORMAL
STREP A DIRECT SCREEN: NEGATIVE

## 2020-01-17 ENCOUNTER — APPOINTMENT (OUTPATIENT)
Dept: GENERAL RADIOLOGY | Age: 35
End: 2020-01-17
Payer: MEDICAID

## 2020-01-17 ENCOUNTER — HOSPITAL ENCOUNTER (EMERGENCY)
Age: 35
Discharge: HOME | End: 2020-01-17
Payer: MEDICAID

## 2020-01-17 VITALS
HEART RATE: 86 BPM | DIASTOLIC BLOOD PRESSURE: 73 MMHG | BODY MASS INDEX: 40.97 KG/M2 | OXYGEN SATURATION: 97 % | HEIGHT: 64 IN | WEIGHT: 240 LBS | RESPIRATION RATE: 16 BRPM | TEMPERATURE: 98.1 F | SYSTOLIC BLOOD PRESSURE: 119 MMHG

## 2020-01-17 LAB
ADENOVIRUS DETECTION BY PCR: NOT DETECTED
ALBUMIN SERPL-MCNC: 3.8 GM/DL (ref 3.4–5)
ALP BLD-CCNC: 63 IU/L (ref 40–129)
ALT SERPL-CCNC: 21 U/L (ref 10–40)
ANION GAP SERPL CALCULATED.3IONS-SCNC: 11 MMOL/L (ref 4–16)
AST SERPL-CCNC: 15 IU/L (ref 15–37)
BASOPHILS ABSOLUTE: 0.1 K/CU MM
BASOPHILS RELATIVE PERCENT: 0.6 % (ref 0–1)
BILIRUB SERPL-MCNC: 0.1 MG/DL (ref 0–1)
BORDETELLA PERTUSSIS PCR: NOT DETECTED
BUN BLDV-MCNC: 9 MG/DL (ref 6–23)
CALCIUM SERPL-MCNC: 8.7 MG/DL (ref 8.3–10.6)
CHLAMYDOPHILA PNEUMONIA PCR: NOT DETECTED
CHLORIDE BLD-SCNC: 100 MMOL/L (ref 99–110)
CO2: 25 MMOL/L (ref 21–32)
CORONAVIRUS 229E PCR: NOT DETECTED
CORONAVIRUS HKU1 PCR: NOT DETECTED
CORONAVIRUS NL63 PCR: NOT DETECTED
CORONAVIRUS OC43 PCR: NOT DETECTED
CREAT SERPL-MCNC: 0.8 MG/DL (ref 0.6–1.1)
DIFFERENTIAL TYPE: ABNORMAL
EOSINOPHILS ABSOLUTE: 0.4 K/CU MM
EOSINOPHILS RELATIVE PERCENT: 4 % (ref 0–3)
GFR AFRICAN AMERICAN: >60 ML/MIN/1.73M2
GFR NON-AFRICAN AMERICAN: >60 ML/MIN/1.73M2
GLUCOSE BLD-MCNC: 117 MG/DL (ref 70–99)
HCT VFR BLD CALC: 45.5 % (ref 37–47)
HEMOGLOBIN: 14.8 GM/DL (ref 12.5–16)
HUMAN METAPNEUMOVIRUS PCR: NOT DETECTED
IMMATURE NEUTROPHIL %: 0.3 % (ref 0–0.43)
INFLUENZA A BY PCR: NOT DETECTED
INFLUENZA A H1 (2009) PCR: NOT DETECTED
INFLUENZA A H1 PANDEMIC PCR: NOT DETECTED
INFLUENZA A H3 PCR: NOT DETECTED
INFLUENZA B BY PCR: NOT DETECTED
LYMPHOCYTES ABSOLUTE: 2.9 K/CU MM
LYMPHOCYTES RELATIVE PERCENT: 28.2 % (ref 24–44)
MCH RBC QN AUTO: 30.8 PG (ref 27–31)
MCHC RBC AUTO-ENTMCNC: 32.5 % (ref 32–36)
MCV RBC AUTO: 94.6 FL (ref 78–100)
MONOCYTES ABSOLUTE: 1 K/CU MM
MONOCYTES RELATIVE PERCENT: 9.3 % (ref 0–4)
MYCOPLASMA PNEUMONIAE PCR: NOT DETECTED
NUCLEATED RBC %: 0 %
PARAINFLUENZA 1 PCR: NOT DETECTED
PARAINFLUENZA 2 PCR: NOT DETECTED
PARAINFLUENZA 3 PCR: NOT DETECTED
PARAINFLUENZA 4 PCR: NOT DETECTED
PDW BLD-RTO: 13.2 % (ref 11.7–14.9)
PLATELET # BLD: 342 K/CU MM (ref 140–440)
PMV BLD AUTO: 9.8 FL (ref 7.5–11.1)
POTASSIUM SERPL-SCNC: 4.4 MMOL/L (ref 3.5–5.1)
PRO-BNP: 29.72 PG/ML
RBC # BLD: 4.81 M/CU MM (ref 4.2–5.4)
RHINOVIRUS ENTEROVIRUS PCR: NOT DETECTED
RSV PCR: NOT DETECTED
SEGMENTED NEUTROPHILS ABSOLUTE COUNT: 6 K/CU MM
SEGMENTED NEUTROPHILS RELATIVE PERCENT: 57.6 % (ref 36–66)
SODIUM BLD-SCNC: 136 MMOL/L (ref 135–145)
TOTAL IMMATURE NEUTOROPHIL: 0.03 K/CU MM
TOTAL NUCLEATED RBC: 0 K/CU MM
TOTAL PROTEIN: 7 GM/DL (ref 6.4–8.2)
TROPONIN T: <0.01 NG/ML
WBC # BLD: 10.3 K/CU MM (ref 4–10.5)

## 2020-01-17 PROCEDURE — 6370000000 HC RX 637 (ALT 250 FOR IP): Performed by: PHYSICIAN ASSISTANT

## 2020-01-17 PROCEDURE — 83880 ASSAY OF NATRIURETIC PEPTIDE: CPT

## 2020-01-17 PROCEDURE — 94761 N-INVAS EAR/PLS OXIMETRY MLT: CPT

## 2020-01-17 PROCEDURE — 87804 INFLUENZA ASSAY W/OPTIC: CPT

## 2020-01-17 PROCEDURE — 94640 AIRWAY INHALATION TREATMENT: CPT

## 2020-01-17 PROCEDURE — 84484 ASSAY OF TROPONIN QUANT: CPT

## 2020-01-17 PROCEDURE — 87798 DETECT AGENT NOS DNA AMP: CPT

## 2020-01-17 PROCEDURE — 87486 CHLMYD PNEUM DNA AMP PROBE: CPT

## 2020-01-17 PROCEDURE — 87633 RESP VIRUS 12-25 TARGETS: CPT

## 2020-01-17 PROCEDURE — 99284 EMERGENCY DEPT VISIT MOD MDM: CPT

## 2020-01-17 PROCEDURE — 85025 COMPLETE CBC W/AUTO DIFF WBC: CPT

## 2020-01-17 PROCEDURE — 87581 M.PNEUMON DNA AMP PROBE: CPT

## 2020-01-17 PROCEDURE — 80053 COMPREHEN METABOLIC PANEL: CPT

## 2020-01-17 PROCEDURE — 71045 X-RAY EXAM CHEST 1 VIEW: CPT

## 2020-01-17 PROCEDURE — 93005 ELECTROCARDIOGRAM TRACING: CPT | Performed by: PHYSICIAN ASSISTANT

## 2020-01-17 RX ORDER — IPRATROPIUM BROMIDE AND ALBUTEROL SULFATE 2.5; .5 MG/3ML; MG/3ML
1 SOLUTION RESPIRATORY (INHALATION) ONCE
Status: COMPLETED | OUTPATIENT
Start: 2020-01-17 | End: 2020-01-17

## 2020-01-17 RX ORDER — GUAIFENESIN/DEXTROMETHORPHAN 100-10MG/5
10 SYRUP ORAL ONCE
Status: COMPLETED | OUTPATIENT
Start: 2020-01-17 | End: 2020-01-17

## 2020-01-17 RX ORDER — NAPROXEN 500 MG/1
500 TABLET ORAL 2 TIMES DAILY
Qty: 60 TABLET | Refills: 0 | Status: SHIPPED | OUTPATIENT
Start: 2020-01-17 | End: 2020-09-13

## 2020-01-17 RX ORDER — GUAIFENESIN/DEXTROMETHORPHAN 100-10MG/5
10 SYRUP ORAL 3 TIMES DAILY PRN
Qty: 236 ML | Refills: 1 | Status: SHIPPED | OUTPATIENT
Start: 2020-01-17 | End: 2020-01-27

## 2020-01-17 RX ORDER — OXYMETAZOLINE HYDROCHLORIDE 0.05 G/100ML
2 SPRAY NASAL 2 TIMES DAILY
Qty: 1 BOTTLE | Refills: 0 | Status: SHIPPED | OUTPATIENT
Start: 2020-01-17 | End: 2020-09-13

## 2020-01-17 RX ORDER — ALBUTEROL SULFATE 90 UG/1
2 AEROSOL, METERED RESPIRATORY (INHALATION) EVERY 6 HOURS PRN
Qty: 1 INHALER | Refills: 0 | Status: SHIPPED | OUTPATIENT
Start: 2020-01-17 | End: 2020-09-13

## 2020-01-17 RX ADMIN — GUAIFENESIN AND DEXTROMETHORPHAN 10 ML: 100; 10 SYRUP ORAL at 19:40

## 2020-01-17 RX ADMIN — IPRATROPIUM BROMIDE AND ALBUTEROL SULFATE 1 AMPULE: .5; 3 SOLUTION RESPIRATORY (INHALATION) at 19:42

## 2020-01-17 ASSESSMENT — HEART SCORE: ECG: 0

## 2020-01-17 ASSESSMENT — PAIN SCALES - GENERAL
PAINLEVEL_OUTOF10: 5
PAINLEVEL_OUTOF10: 0

## 2020-01-18 PROCEDURE — 93010 ELECTROCARDIOGRAM REPORT: CPT | Performed by: INTERNAL MEDICINE

## 2020-01-18 NOTE — ED PROVIDER NOTES
eMERGENCY dEPARTMENT eNCOUnter        PCP: Olivia Villalba MD    [facility]    Chief Complaint   Patient presents with    Shortness of Breath    Chest Pain     Pt was not seen by physician    HPI    Noemí Martino is a 27 y.o. female who presents with chest pain. Patient reports 2 days of constant chest pain, shortness of breath. She reports productive cough. Denies hemoptysis. Denies fevers. She does admit to some sweats. Denies sick contacts. She is a smoker and has positive family history but denies other cardiac risk factors. Denies all risk factors for DVT PE. Denies abdominal complaints. REVIEW OF SYSTEMS    Constitutional:  Denies fever, chills. HENT:  Denies sore throat or ear pain   Cardiovascular:  See HPI. Denies palpitations,  Denies syncope   Respiratory:    See HPI.   Denies hemoptysis    GI:  Denies abdominal pain, nausea, vomiting, or diarrhea  :  Denies any urinary symptoms   Musculoskeletal:   Denies back pain,   Skin:  Denies rash  Neurologic:  Denies lightheadedness, dizziness, headache, focal weakness or sensory changes   Endocrine:  Denies polyuria or polydypsia   Lymphatic:  Denies swollen glands     All other review of systems are negative  See HPI and nursing notes for additional information     PAST MEDICAL & SURGICAL HISTORY    Past Medical History:   Diagnosis Date    Asthma     as a child    Shock ([facility] [address]     post partum     Past Surgical History:   Procedure Laterality Date   [address], LOW TRANSVERSE  2013, 2014    COLONOSCOPY  03/19/2019    polyps x5, Internal grade 1 hemorrhoids    COLONOSCOPY N/A 3/19/2019    COLONOSCOPY CONTROL HEMORRHAGE with polypectomy performed by Elder Mike MD at [place] [address]      3-4    HEMORRHOID SURGERY         CURRENT MEDICATIONS    Current Outpatient Rx   Medication Sig Dispense Refill    lidocaine viscous hcl (XYLOCAINE) 2 % SOLN solution Gargle and swallow 10mL of solution by PCR NOT DETECTED NOT DETECTED    Coronavirus 229E PCR NOT DETECTED NOT DETECTED    Coronavirus HKU1 PCR NOT DETECTED NOT DETECTED    Coronavirus NL63 PCR NOT DETECTED NOT DETECTED    Coronavirus OC43 PCR NOT DETECTED NOT DETECTED    Human Metapneumovirus PCR NOT DETECTED NOT DETECTED    Rhinovirus Enterovirus PCR NOT DETECTED NOT DETECTED    Influenza A by PCR NOT DETECTED NOT DETECTED    Influenza A H1 (2009) PCR NOT DETECTED NOT DETECTED    Influenza A H1 Pandemic PCR NOT DETECTED NOT DETECTED    Influenza A H3 PCR NOT DETECTED NOT DETECTED    Influenza B by PCR NOT DETECTED NOT DETECTED    Parainfluenza 1 PCR NOT DETECTED NOT DETECTED    Parainfluenza 2 PCR NOT DETECTED NOT DETECTED    Parainfluenza 3 PCR NOT DETECTED NOT DETECTED    Parainfluenza 4 PCR NOT DETECTED NOT DETECTED    RSV PCR NOT DETECTED NOT DETECTED    B Pertussis by PCR NOT DETECTED NOT DETECTED    Chlamydophila Pneumonia PCR NOT DETECTED NOT DETECTED    Mycoplasma pneumo by PCR NOT DETECTED NOT DETECTED   CBC auto diff   Result Value Ref Range    WBC 10.3 4.0 - 10.5 K/CU MM    RBC 4.81 4.2 - 5.4 M/CU MM    Hemoglobin 14.8 12.5 - 16.0 GM/DL    Hematocrit 45.5 37 - 47 %    MCV 94.6 78 - 100 FL    MCH 30.8 27 - 31 PG    MCHC 32.5 32.0 - 36.0 %    RDW 13.2 11.7 - 14.9 %    Platelets 946 436 - 797 K/CU MM    MPV 9.8 7.5 - 11.1 FL    Differential Type AUTOMATED DIFFERENTIAL     Segs Relative 57.6 36 - 66 %    Lymphocytes % 28.2 24 - 44 %    Monocytes % 9.3 (H) 0 - 4 %    Eosinophils % 4.0 (H) 0 - 3 %    Basophils % 0.6 0 - 1 %    Segs Absolute 6.0 K/CU MM    Lymphocytes Absolute 2.9 K/CU MM    Monocytes Absolute 1.0 K/CU MM    Eosinophils Absolute 0.4 K/CU MM    Basophils Absolute 0.1 K/CU MM    Nucleated RBC % 0.0 %    Total Nucleated RBC 0.0 K/CU MM    Total Immature Neutrophil 0.03 K/CU MM    Immature Neutrophil % 0.3 0 - 0.43 %   CMP   Result Value Ref Range    Sodium 136 135 - 145 MMOL/L    Potassium 4.4 3.5 - 5.1 MMOL/L    Chloride 100 99 - 110 mMol/L    CO2 25 21 - 32 MMOL/L    BUN 9 6 - 23 MG/DL    CREATININE 0.8 0.6 - 1.1 MG/DL    Glucose 117 (H) 70 - 99 MG/DL    Calcium 8.7 8.3 - 10.6 MG/DL    Alb 3.8 3.4 - 5.0 GM/DL    Total Protein 7.0 6.4 - 8.2 GM/DL    Total Bilirubin 0.1 0.0 - 1.0 MG/DL    ALT 21 10 - 40 U/L    AST 15 15 - 37 IU/L    Alkaline Phosphatase 63 40 - 129 IU/L    GFR Non-African American >60 >60 mL/min/1.73m2    GFR African American >60 >60 mL/min/1.73m2    Anion Gap 11 4 - 16   Troponin   Result Value Ref Range    Troponin T <0.010 <0.01 NG/ML   Brain Natriuretic Peptide   Result Value Ref Range    Pro-BNP 29.72 <300 PG/ML   EKG 12 Lead   Result Value Ref Range    Ventricular Rate 87 BPM    Atrial Rate 87 BPM    P-R Interval 184 ms    QRS Duration 82 ms    Q-T Interval 352 ms    QTc Calculation (Bazett) 423 ms    P Axis 51 degrees    R Axis 56 degrees    T Axis 42 degrees    Diagnosis       Normal sinus rhythm with sinus arrhythmia  Normal ECG  No previous ECGs available  Confirmed by [facility] ([id number]) on 1/18/2020 10:44:33 AM             ED COURSE & MEDICAL DECISION MAKING     Vital signs and nursing notes reviewed during ED course. Patient care and presentation staffed with supervising MD.  All pertinent Lab data and radiographic results reviewed with patient at bedside. The patient and/or the family were informed of the results of any tests/labs/imaging, the treatment plan, and time was allotted to answer questions. See chart for details of medications given during the ED stay. Differential Diagnosis: Acute bronchitis, Musculoskeletal chest pain, Pleurisy, Pulmonary edema, Congestive Heart Failure, Myocardial Infarction, Pulmonary Embolus, Thoracic Dissection, Pericarditis, Pericardial Effusion, Pneumonia, Pneumothorax, Boerhaave's syndrome, other GI cause, other. Clinical  IMPRESSION    1. Chest pain, unspecified type    2. Cough        Patient presents as above.   Heart score is 1 for risks of

## 2020-01-18 NOTE — ED NOTES
Called lab regarding swab results. Lab informed me that swab results are taking longer due to the increased amount that is needing to be tested.       Benjamin Ling RN  01/17/20 [id number]

## 2020-01-22 LAB
EKG ATRIAL RATE: 87 BPM
EKG DIAGNOSIS: NORMAL
EKG P AXIS: 51 DEGREES
EKG P-R INTERVAL: 184 MS
EKG Q-T INTERVAL: 352 MS
EKG QRS DURATION: 82 MS
EKG QTC CALCULATION (BAZETT): 423 MS
EKG R AXIS: 56 DEGREES
EKG T AXIS: 42 DEGREES
EKG VENTRICULAR RATE: 87 BPM

## 2020-09-13 ENCOUNTER — APPOINTMENT (OUTPATIENT)
Dept: GENERAL RADIOLOGY | Age: 35
DRG: 133 | End: 2020-09-13
Payer: MEDICAID

## 2020-09-13 ENCOUNTER — HOSPITAL ENCOUNTER (INPATIENT)
Age: 35
LOS: 3 days | Discharge: HOME | DRG: 133 | End: 2020-09-16
Attending: EMERGENCY MEDICINE | Admitting: INTERNAL MEDICINE
Payer: MEDICAID

## 2020-09-13 PROBLEM — Z20.822 SUSPECTED COVID-19 VIRUS INFECTION: Status: ACTIVE | Noted: 2020-09-13

## 2020-09-13 LAB
ADENOVIRUS DETECTION BY PCR: NOT DETECTED
ALBUMIN SERPL-MCNC: 4.1 GM/DL (ref 3.4–5)
ALP BLD-CCNC: 63 IU/L (ref 40–129)
ALT SERPL-CCNC: 28 U/L (ref 10–40)
ANION GAP SERPL CALCULATED.3IONS-SCNC: 13 MMOL/L (ref 4–16)
AST SERPL-CCNC: 21 IU/L (ref 15–37)
BASOPHILS ABSOLUTE: 0.1 K/CU MM
BASOPHILS RELATIVE PERCENT: 0.8 % (ref 0–1)
BILIRUB SERPL-MCNC: 0.2 MG/DL (ref 0–1)
BORDETELLA PARAPERTUSSIS BY PCR: NOT DETECTED
BORDETELLA PERTUSSIS PCR: NOT DETECTED
BUN BLDV-MCNC: 8 MG/DL (ref 6–23)
CALCIUM SERPL-MCNC: 9.3 MG/DL (ref 8.3–10.6)
CHLAMYDOPHILA PNEUMONIA PCR: NOT DETECTED
CHLORIDE BLD-SCNC: 103 MMOL/L (ref 99–110)
CO2: 24 MMOL/L (ref 21–32)
CORONAVIRUS 229E PCR: NOT DETECTED
CORONAVIRUS HKU1 PCR: NOT DETECTED
CORONAVIRUS NL63 PCR: NOT DETECTED
CORONAVIRUS OC43 PCR: NOT DETECTED
CREAT SERPL-MCNC: 0.8 MG/DL (ref 0.6–1.1)
D DIMER: 344 NG/ML(DDU)
DIFFERENTIAL TYPE: ABNORMAL
EOSINOPHILS ABSOLUTE: 0.4 K/CU MM
EOSINOPHILS RELATIVE PERCENT: 4.1 % (ref 0–3)
GFR AFRICAN AMERICAN: >60 ML/MIN/1.73M2
GFR NON-AFRICAN AMERICAN: >60 ML/MIN/1.73M2
GLUCOSE BLD-MCNC: 97 MG/DL (ref 70–99)
HCT VFR BLD CALC: 47.9 % (ref 37–47)
HEMOGLOBIN: 16.2 GM/DL (ref 12.5–16)
HUMAN METAPNEUMOVIRUS PCR: NOT DETECTED
IMMATURE NEUTROPHIL %: 0.2 % (ref 0–0.43)
INFLUENZA A BY PCR: NOT DETECTED
INFLUENZA A H1 (2009) PCR: NOT DETECTED
INFLUENZA A H1 PANDEMIC PCR: NOT DETECTED
INFLUENZA A H3 PCR: NOT DETECTED
INFLUENZA B BY PCR: NOT DETECTED
LYMPHOCYTES ABSOLUTE: 2.1 K/CU MM
LYMPHOCYTES RELATIVE PERCENT: 24.7 % (ref 24–44)
MCH RBC QN AUTO: 32 PG (ref 27–31)
MCHC RBC AUTO-ENTMCNC: 33.8 % (ref 32–36)
MCV RBC AUTO: 94.7 FL (ref 78–100)
MONOCYTES ABSOLUTE: 0.9 K/CU MM
MONOCYTES RELATIVE PERCENT: 11 % (ref 0–4)
MYCOPLASMA PNEUMONIAE PCR: NOT DETECTED
NUCLEATED RBC %: 0 %
PARAINFLUENZA 1 PCR: NOT DETECTED
PARAINFLUENZA 2 PCR: NOT DETECTED
PARAINFLUENZA 3 PCR: NOT DETECTED
PARAINFLUENZA 4 PCR: NOT DETECTED
PDW BLD-RTO: 13.6 % (ref 11.7–14.9)
PLATELET # BLD: 337 K/CU MM (ref 140–440)
PMV BLD AUTO: 10 FL (ref 7.5–11.1)
POTASSIUM SERPL-SCNC: 4.2 MMOL/L (ref 3.5–5.1)
PRO-BNP: 45.65 PG/ML
RBC # BLD: 5.06 M/CU MM (ref 4.2–5.4)
REASON FOR REJECTION: NORMAL
REASON FOR REJECTION: NORMAL
REJECTED TEST: NORMAL
REJECTED TEST: NORMAL
RHINOVIRUS ENTEROVIRUS PCR: ABNORMAL
RSV PCR: NOT DETECTED
SEGMENTED NEUTROPHILS ABSOLUTE COUNT: 5.1 K/CU MM
SEGMENTED NEUTROPHILS RELATIVE PERCENT: 59.2 % (ref 36–66)
SODIUM BLD-SCNC: 140 MMOL/L (ref 135–145)
TOTAL IMMATURE NEUTOROPHIL: 0.02 K/CU MM
TOTAL NUCLEATED RBC: 0 K/CU MM
TOTAL PROTEIN: 7 GM/DL (ref 6.4–8.2)
TROPONIN T: 0.03 NG/ML
WBC # BLD: 8.6 K/CU MM (ref 4–10.5)

## 2020-09-13 PROCEDURE — 6370000000 HC RX 637 (ALT 250 FOR IP): Performed by: NURSE PRACTITIONER

## 2020-09-13 PROCEDURE — U0002 COVID-19 LAB TEST NON-CDC: HCPCS

## 2020-09-13 PROCEDURE — 96365 THER/PROPH/DIAG IV INF INIT: CPT

## 2020-09-13 PROCEDURE — 87633 RESP VIRUS 12-25 TARGETS: CPT

## 2020-09-13 PROCEDURE — 1200000000 HC SEMI PRIVATE

## 2020-09-13 PROCEDURE — 2060000000 HC ICU INTERMEDIATE R&B

## 2020-09-13 PROCEDURE — G0378 HOSPITAL OBSERVATION PER HR: HCPCS

## 2020-09-13 PROCEDURE — 87486 CHLMYD PNEUM DNA AMP PROBE: CPT

## 2020-09-13 PROCEDURE — 87449 NOS EACH ORGANISM AG IA: CPT

## 2020-09-13 PROCEDURE — 85379 FIBRIN DEGRADATION QUANT: CPT

## 2020-09-13 PROCEDURE — 6370000000 HC RX 637 (ALT 250 FOR IP): Performed by: EMERGENCY MEDICINE

## 2020-09-13 PROCEDURE — 83880 ASSAY OF NATRIURETIC PEPTIDE: CPT

## 2020-09-13 PROCEDURE — 94761 N-INVAS EAR/PLS OXIMETRY MLT: CPT

## 2020-09-13 PROCEDURE — 84484 ASSAY OF TROPONIN QUANT: CPT

## 2020-09-13 PROCEDURE — 71045 X-RAY EXAM CHEST 1 VIEW: CPT

## 2020-09-13 PROCEDURE — 6360000002 HC RX W HCPCS: Performed by: INTERNAL MEDICINE

## 2020-09-13 PROCEDURE — 2580000003 HC RX 258: Performed by: INTERNAL MEDICINE

## 2020-09-13 PROCEDURE — 87798 DETECT AGENT NOS DNA AMP: CPT

## 2020-09-13 PROCEDURE — 87899 AGENT NOS ASSAY W/OPTIC: CPT

## 2020-09-13 PROCEDURE — 80053 COMPREHEN METABOLIC PANEL: CPT

## 2020-09-13 PROCEDURE — 87581 M.PNEUMON DNA AMP PROBE: CPT

## 2020-09-13 PROCEDURE — 93005 ELECTROCARDIOGRAM TRACING: CPT | Performed by: EMERGENCY MEDICINE

## 2020-09-13 PROCEDURE — 96375 TX/PRO/DX INJ NEW DRUG ADDON: CPT

## 2020-09-13 PROCEDURE — 6370000000 HC RX 637 (ALT 250 FOR IP): Performed by: INTERNAL MEDICINE

## 2020-09-13 PROCEDURE — 85025 COMPLETE CBC W/AUTO DIFF WBC: CPT

## 2020-09-13 PROCEDURE — 94640 AIRWAY INHALATION TREATMENT: CPT

## 2020-09-13 PROCEDURE — 99285 EMERGENCY DEPT VISIT HI MDM: CPT

## 2020-09-13 RX ORDER — PROMETHAZINE HYDROCHLORIDE 25 MG/1
12.5 TABLET ORAL EVERY 6 HOURS PRN
Status: DISCONTINUED | OUTPATIENT
Start: 2020-09-13 | End: 2020-09-16 | Stop reason: HOSPADM

## 2020-09-13 RX ORDER — PROMETHAZINE HYDROCHLORIDE 25 MG/1
25 TABLET ORAL ONCE
Status: COMPLETED | OUTPATIENT
Start: 2020-09-13 | End: 2020-09-13

## 2020-09-13 RX ORDER — ONDANSETRON 2 MG/ML
4 INJECTION INTRAMUSCULAR; INTRAVENOUS EVERY 6 HOURS PRN
Status: DISCONTINUED | OUTPATIENT
Start: 2020-09-13 | End: 2020-09-16 | Stop reason: HOSPADM

## 2020-09-13 RX ORDER — ACETAMINOPHEN 650 MG/1
650 SUPPOSITORY RECTAL EVERY 6 HOURS PRN
Status: DISCONTINUED | OUTPATIENT
Start: 2020-09-13 | End: 2020-09-16 | Stop reason: HOSPADM

## 2020-09-13 RX ORDER — ALBUTEROL SULFATE 90 UG/1
2 AEROSOL, METERED RESPIRATORY (INHALATION) 4 TIMES DAILY
Status: DISCONTINUED | OUTPATIENT
Start: 2020-09-13 | End: 2020-09-16 | Stop reason: HOSPADM

## 2020-09-13 RX ORDER — METHYLPREDNISOLONE SODIUM SUCCINATE 40 MG/ML
40 INJECTION, POWDER, LYOPHILIZED, FOR SOLUTION INTRAMUSCULAR; INTRAVENOUS EVERY 6 HOURS
Status: DISCONTINUED | OUTPATIENT
Start: 2020-09-14 | End: 2020-09-15

## 2020-09-13 RX ORDER — SODIUM CHLORIDE 0.9 % (FLUSH) 0.9 %
10 SYRINGE (ML) INJECTION PRN
Status: DISCONTINUED | OUTPATIENT
Start: 2020-09-13 | End: 2020-09-16 | Stop reason: HOSPADM

## 2020-09-13 RX ORDER — POLYETHYLENE GLYCOL 3350 17 G/17G
17 POWDER, FOR SOLUTION ORAL DAILY PRN
Status: DISCONTINUED | OUTPATIENT
Start: 2020-09-13 | End: 2020-09-16 | Stop reason: HOSPADM

## 2020-09-13 RX ORDER — POTASSIUM CHLORIDE 20 MEQ/1
40 TABLET, EXTENDED RELEASE ORAL PRN
Status: DISCONTINUED | OUTPATIENT
Start: 2020-09-13 | End: 2020-09-16 | Stop reason: HOSPADM

## 2020-09-13 RX ORDER — POTASSIUM CHLORIDE 7.45 MG/ML
10 INJECTION INTRAVENOUS PRN
Status: DISCONTINUED | OUTPATIENT
Start: 2020-09-13 | End: 2020-09-16 | Stop reason: HOSPADM

## 2020-09-13 RX ORDER — ALBUTEROL SULFATE 90 UG/1
2 AEROSOL, METERED RESPIRATORY (INHALATION) ONCE
Status: COMPLETED | OUTPATIENT
Start: 2020-09-13 | End: 2020-09-13

## 2020-09-13 RX ORDER — ACETAMINOPHEN 325 MG/1
650 TABLET ORAL ONCE
Status: COMPLETED | OUTPATIENT
Start: 2020-09-13 | End: 2020-09-13

## 2020-09-13 RX ORDER — ACETAMINOPHEN 325 MG/1
650 TABLET ORAL EVERY 6 HOURS PRN
Status: DISCONTINUED | OUTPATIENT
Start: 2020-09-13 | End: 2020-09-16 | Stop reason: HOSPADM

## 2020-09-13 RX ORDER — NICOTINE 21 MG/24HR
1 PATCH, TRANSDERMAL 24 HOURS TRANSDERMAL DAILY
Status: DISCONTINUED | OUTPATIENT
Start: 2020-09-14 | End: 2020-09-16 | Stop reason: HOSPADM

## 2020-09-13 RX ORDER — LANOLIN ALCOHOL/MO/W.PET/CERES
3 CREAM (GRAM) TOPICAL NIGHTLY PRN
Status: DISCONTINUED | OUTPATIENT
Start: 2020-09-13 | End: 2020-09-16 | Stop reason: HOSPADM

## 2020-09-13 RX ORDER — ASPIRIN 81 MG/1
81 TABLET, CHEWABLE ORAL DAILY
Status: DISCONTINUED | OUTPATIENT
Start: 2020-09-14 | End: 2020-09-16 | Stop reason: HOSPADM

## 2020-09-13 RX ORDER — NITROGLYCERIN 0.4 MG/1
0.4 TABLET SUBLINGUAL EVERY 5 MIN PRN
Status: DISCONTINUED | OUTPATIENT
Start: 2020-09-13 | End: 2020-09-16 | Stop reason: HOSPADM

## 2020-09-13 RX ORDER — GUAIFENESIN/DEXTROMETHORPHAN 100-10MG/5
5 SYRUP ORAL EVERY 4 HOURS PRN
Status: DISCONTINUED | OUTPATIENT
Start: 2020-09-13 | End: 2020-09-16 | Stop reason: HOSPADM

## 2020-09-13 RX ORDER — SODIUM CHLORIDE 0.9 % (FLUSH) 0.9 %
10 SYRINGE (ML) INJECTION EVERY 12 HOURS SCHEDULED
Status: DISCONTINUED | OUTPATIENT
Start: 2020-09-13 | End: 2020-09-16 | Stop reason: HOSPADM

## 2020-09-13 RX ORDER — FAMOTIDINE 20 MG/1
20 TABLET, FILM COATED ORAL 2 TIMES DAILY
Status: DISCONTINUED | OUTPATIENT
Start: 2020-09-13 | End: 2020-09-16 | Stop reason: HOSPADM

## 2020-09-13 RX ADMIN — AZITHROMYCIN MONOHYDRATE 500 MG: 500 INJECTION, POWDER, LYOPHILIZED, FOR SOLUTION INTRAVENOUS at 22:40

## 2020-09-13 RX ADMIN — PROMETHAZINE HYDROCHLORIDE 25 MG: 25 TABLET ORAL at 15:45

## 2020-09-13 RX ADMIN — ALBUTEROL SULFATE 2 PUFF: 90 AEROSOL, METERED RESPIRATORY (INHALATION) at 16:43

## 2020-09-13 RX ADMIN — SODIUM CHLORIDE, PRESERVATIVE FREE 10 ML: 5 INJECTION INTRAVENOUS at 22:40

## 2020-09-13 RX ADMIN — Medication 2 PUFF: at 22:03

## 2020-09-13 RX ADMIN — ACETAMINOPHEN 650 MG: 325 TABLET ORAL at 15:45

## 2020-09-13 RX ADMIN — MELATONIN 3 MG: at 22:40

## 2020-09-13 RX ADMIN — FAMOTIDINE 20 MG: 20 TABLET, FILM COATED ORAL at 22:40

## 2020-09-13 RX ADMIN — PREDNISONE 50 MG: 20 TABLET ORAL at 15:45

## 2020-09-13 RX ADMIN — GUAIFENESIN AND DEXTROMETHORPHAN 5 ML: 100; 10 SYRUP ORAL at 22:40

## 2020-09-13 RX ADMIN — ALBUTEROL SULFATE 2 PUFF: 108 AEROSOL, METERED RESPIRATORY (INHALATION) at 22:03

## 2020-09-13 RX ADMIN — ALBUTEROL SULFATE 2 PUFF: 90 AEROSOL, METERED RESPIRATORY (INHALATION) at 18:24

## 2020-09-13 RX ADMIN — METHYLPREDNISOLONE SODIUM SUCCINATE 40 MG: 40 INJECTION, POWDER, FOR SOLUTION INTRAMUSCULAR; INTRAVENOUS at 22:40

## 2020-09-13 ASSESSMENT — PAIN SCALES - GENERAL
PAINLEVEL_OUTOF10: 6
PAINLEVEL_OUTOF10: 0
PAINLEVEL_OUTOF10: 6

## 2020-09-13 ASSESSMENT — PAIN DESCRIPTION - LOCATION: LOCATION: CHEST;HEAD

## 2020-09-13 ASSESSMENT — PAIN DESCRIPTION - PAIN TYPE: TYPE: ACUTE PAIN

## 2020-09-13 NOTE — ED NOTES
XR CHEST PORTABLE   Status: Final result    Order Providers     Authorizing  Billing    MD Jung Morton MD            Signed by     Signed  Date/Time   Phone  Pager    Noel Garcia  9/13/2020  16:08  [phone]     Reading Providers     Read  Date  Phone  Pager    Noel Garcia  Sep 13, 2020  [phone]     Routing History     Priority  Sent On  From  To  Message Type      9/13/2020  4:11 PM  Baudilio, [facility] Incoming Radiology Results From Nancy Rivera MD  [address] [place] [address] Street Results      9/13/2020  3:36 PM  [facility] Incoming Lab Results From Marcei Wall)  Emmy Lange MD  CC'd Results    Radiation Dose Estimates     No radiation information found for this patient    Narrative    EXAMINATION:    ONE XRAY VIEW OF THE CHEST         9/13/2020 3:45 pm         COMPARISON:    Chest radiograph 01/17/2020.         HISTORY:    ORDERING SYSTEM PROVIDED HISTORY: shortness of breath. cough    TECHNOLOGIST PROVIDED HISTORY:    Reason for exam:->shortness of breath. cough    Reason for Exam: shortness of breath. cough    Acuity: Acute    Type of Exam: Initial    Additional signs and symptoms: none    Relevant Medical/Surgical History: asthma         FINDINGS:    The lungs are without acute focal process.  There is no effusion or    pneumothorax. The cardiomediastinal silhouette is without acute process. The    osseous structures are without acute process.              Impression    No acute process.            Dheeraj Lala RN  09/13/20 [id number]

## 2020-09-13 NOTE — CARE COORDINATION
LSW completed chart review. This pt here for sob, cough and CP & pt has been tested for Covid-19 today. Pt also seen 1/17/20 for sob and CP. Chest XR is clear. RT completed breathing tx. LSW spoke to this pt from door wearing mask/PPE and explained CM role. Pt reports she lives w/her 5 kids in a 1 story home. Pt does have insurance and can afford her medications. Pt does have a PCP. Pt has the following DME: none. 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     LSW completed ACP w/pt. LSW informed pt that if he/she is d/c'd, [facility] ([facility] is coordinating w/[facility] to have all Covid tested pt's scripts filled @ [facility]; unless he/she objects. The script(s) will be delivered to pt's house. Pt agreed w/this POC. D/T pt having bronchitis and hypoxia, she is requiring admission.

## 2020-09-13 NOTE — ED NOTES
Dr. Eyal Lopes notified pt Sp02 dropped to 83% on room air at rest and troponin result. Pt Sp02 back up to 96% on 2L o2 via NC. Dr. Eyal Lopes at bedside.       Yue Moyer RN  09/13/20 [id number]

## 2020-09-13 NOTE — LETTER
[facility] ICU Stepdown  [address] [address]  Phone: [phone]  Fax: [phone]        September 16, 2020     Patient: Stephanie Nevarez   YOB: 1985   Date of Visit: 9/13/2020       To Whom It May Concern: It is my medical opinion that Stephanie Nevarez may return to work on 9/18/2020. If you have any questions or concerns, please don't hesitate to call.     Sincerely,      Dr. Flaquita Jones MD

## 2020-09-13 NOTE — H&P
fabienne Alves is a 38 y.o.  female  with past medical history of childhood asthma, tobacco abuse, depression who presents with cough, shortness of breath and chest pain. Patient states that she started to have cough and shortness of breath yesterday which became progressively worse forcing her to come to the ED. She also complains of some wheezing during the coughing spells. There is associated bilateral chest pain under her breast which is mainly felt during her coughing spells. She also feels some tightness of the chest.  Admits to be smoking about a pack a day for the last 24 years. Denies any fever. She has no nausea or vomiting. There is no change in her appetite, has no change in her taste or smell for food. No diarrhea. No palpitations. She denies any contact with any suspected or confirmed COVID patients. 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    Ten point ROS reviewed negative, unless as noted above    Objective:   No intake or output data in the 24 hours ending 09/13/20 1837   Vitals:   Vitals:    09/13/20 1830   BP: 136/87   Pulse:    Resp:    Temp:    SpO2: 96%     Physical Exam:    GEN Awake female, resting in bed in no apparent distress. Appears given age. EYES Pupils are equally round. No scleral erythema, discharge, or conjunctivitis. HENT Mucous membranes are moist.   NECK No apparent thyromegaly or masses. RESP Scattered wheezes bilateral chest, no rales or rhonchi. Symmetric chest movement while on room air. CARDIO/VASC S1/S2 auscultated. Regular rate without appreciable murmurs, rubs, or gallops. Peripheral pulses equal bilaterally and palpable. No peripheral edema. GI Abdomen is soft without significant tenderness, masses, or guarding. Bowel sounds are normoactive. Rectal exam deferred.  Patterson catheter is not present. HEME/LYMPH No petechiae or ecchymoses.   MSK No gross joint deformities. Spontaneous movement of all extremities  SKIN Normal coloration, warm, dry. NEURO Cranial nerves appear grossly intact, normal speech, no lateralizing weakness. PSYCH Awake, alert, oriented x 4. Affect appropriate. Past Medical History:      Past Medical History:   Diagnosis Date    Asthma     as a child    Shock ([facility] [address]     post partum     PSHX:  has a past surgical history that includes Dilation and curettage of uterus;  section, low transverse (, ); Hemorrhoid surgery; Colonoscopy (2019); and Colonoscopy (N/A, 3/19/2019). Allergies: No Known Allergies    FAM HX: family history includes Colon Cancer (age of onset: 45) in her mother; Heart Attack in her father.   Soc HX:   Social History     Socioeconomic History    Marital status: Legally      Spouse name: Not on file    Number of children: Not on file    Years of education: Not on file    Highest education level: Not on file   Occupational History    Not on file   Social Needs    Financial resource strain: Not on file    Food insecurity     Worry: Not on file     Inability: Not on file    Transportation needs     Medical: Not on file     Non-medical: Not on file   Tobacco Use    Smoking status: Current Every Day Smoker     Packs/day: 1.00     Years: 15.00     Pack years: 15.00     Types: Cigarettes    Smokeless tobacco: Never Used   Substance and Sexual Activity    Alcohol use: No    Drug use: No    Sexual activity: Yes     Partners: Male   Lifestyle    Physical activity     Days per week: Not on file     Minutes per session: Not on file    Stress: Not on file   Relationships    Social connections     Talks on phone: Not on file     Gets together: Not on file     Attends Alevism service: Not on file     Active member of club or organization: Not on file     Attends meetings of clubs or organizations: Not on file     Relationship status: Not on file    Intimate partner violence     Fear of current or ex partner: Not on file     Emotionally abused: Not on file     Physically abused: Not on file     Forced sexual activity: Not on file   Other Topics Concern    Not on file   Social History Narrative    Not on file       Medications:   Medications:    Infusions:   PRN Meds:   Home medications-   Prior to Admission medications    Not on File       PHYSICIAN CERTIFICATION    I certify that Concepcion Iyer is expected to be hospitalized for greater than 2 midnights based on the above assessment and plan:     Current diagnosis and plan of management discussed with the patient at the time of admission in lay language     Code status was discussed with patient  - Full code     Pain Assessment - chest pain, PRN nitro     Electronically signed by Eulogio Jean MD on 9/13/2020 at 6:37 PM

## 2020-09-13 NOTE — ED NOTES
Pt c/o SOB and cough x 2-3 days. Pt states has midsternal chest pain with pain under bilateral breasts. Pt states has coughed up clear sputum. Pt denies hx of asthma,COPD or CHF, does not take any medications other than multivitamins. Pt states that she does smoke. Pt denies fever or chills, no known COVID contact, denies nausea vomiting and diarrhea.         Lida Mcmillan, JESSICA  09/13/20 [address] JESSICA Swenson  09/13/20 7613

## 2020-09-13 NOTE — ED PROVIDER NOTES
EMERGENCY DEPARTMENT ENCOUNTER    Patient: Roseanna Byrnes  MRN: [id number]  : 1985  Date of Evaluation: 2020  ED Provider:  [address] COMPLAINT  Chief Complaint   Patient presents with    Shortness of Breath    Cough    Chest Pain       HPI  Roseanna Byrnes is a 38 y.o. female who presents with generalized malaise, generalized weakness, generalized fatigue with chest tightness and shortness of breath and nonproductive cough and wheezing and frontal headache over the last 2 days. Symptoms constant. Moderate in severity. Describes chest tightness which is not overtly painful however she does have chest pain only when she coughs. Otherwise not had any chest pain at rest.  She is unaware of any known modifying factors. Denies any other associated symptoms or complaints or concerns. Pt denies recent surgery, long car/plane trip, active cancer, OCPs or hormone therapy, history of blood clots or lower extremity edema.       REVIEW OF SYSTEMS    Constitutional: negative for fever, chills  Neurological: negative for focal weakness, loss of sensation  Ophthalmic: negative for vision change  ENT: negative for congestion, rhinorrhea  Cardiovascular: negative for palpitations, edema  Respiratory: negative for sputum  GI: negative for abdominal pain, nausea, vomiting, diarrhea, constipation  : negative for dysuria, hematuria  Musculoskeletal: negative for myalgias, decreased ROM, joint swelling  Dermatological: negative for rash, wounds  Heme: Negative for bleeding, bruising      PAST MEDICAL HISTORY  Past Medical History:   Diagnosis Date    Asthma     as a child    Shock ([facility] [address]     post partum       CURRENT MEDICATIONS  [unfilled]    ALLERGIES  No Known Allergies    SURGICAL HISTORY  Past Surgical History:   Procedure Laterality Date     SECTION, LOW TRANSVERSE  , 2014    COLONOSCOPY  2019    polyps x5, Internal grade 1 hemorrhoids    COLONOSCOPY N/A 3/19/2019    COLONOSCOPY CONTROL HEMORRHAGE with polypectomy performed by Neno Prince MD at [facility] [address]      3-4    HEMORRHOID SURGERY         FAMILY HISTORY  Family History   Problem Relation Age of Onset    Colon Cancer Mother 45    Heart Attack Father        SOCIAL HISTORY  Social History     Socioeconomic History    Marital status: Legally      Spouse name: Not on file    Number of children: Not on file    Years of education: Not on file    Highest education level: Not on file   Occupational History    Not on file   Social Needs    Financial resource strain: Not on file    Food insecurity     Worry: Not on file     Inability: Not on file    Transportation needs     Medical: Not on file     Non-medical: Not on file   Tobacco Use    Smoking status: Current Every Day Smoker     Packs/day: 1.00     Years: 15.00     Pack years: 15.00     Types: Cigarettes    Smokeless tobacco: Never Used   Substance and Sexual Activity    Alcohol use: No    Drug use: No    Sexual activity: Yes     Partners: Male   Lifestyle    Physical activity     Days per week: Not on file     Minutes per session: Not on file    Stress: Not on file   Relationships    Social connections     Talks on phone: Not on file     Gets together: Not on file     Attends Gnosticist service: Not on file     Active member of club or organization: Not on file     Attends meetings of clubs or organizations: Not on file     Relationship status: Not on file    Intimate partner violence     Fear of current or ex partner: Not on file     Emotionally abused: Not on file     Physically abused: Not on file     Forced sexual activity: Not on file   Other Topics Concern    Not on file   Social History Narrative    Not on file         **Past medical, family and social histories, and nursing notes reviewed and verified by me**      PHYSICAL EXAM  VITAL SIGNS:   ED Triage Vitals   Enc Vitals Group      BP 09/13/20 1515 (!) 128/97      Pulse 09/13/20 1516 101      Resp 09/13/20 1516 20      Temp --       Temp src --       SpO2 09/13/20 1515 99 %      Weight 09/13/20 1516 (!) 331 lb (150.1 kg)      Height 09/13/20 1516 5' 4\" (1.626 m)      Head Circumference --       Peak Flow --       Pain Score --       Pain Loc --       Pain Edu? --       Excl. in [address] during ED course were reviewed and are as charted. Constitutional: Minimal distress, Non-toxic appearance  Eyes: Conjunctiva normal, No discharge  HENT: Normocephalic, Atraumatic, bilateral external ears normal, posterior oropharynx is nonerythematous and without exudate, oropharynx moist  Neck: Supple, no stridor, no grossly visible or palpable masses  Cardiovascular: Regular rate and rhythm, No murmurs, No rubs, No gallops, 2+ symmetrical distal pulses, no JVD  Pulmonary/Chest: Diffuse bilateral wheezes, otherwise no respiratory distress or accessory muscle use, No crackles or rhonchi. Abdomen: Soft, nondistended and nonrigid, No tenderness or peritoneal signs, No masses, normal bowel sounds  Back: No midline point tenderness, No paraspinous muscle tenderness.  No CVA tenderness  Extremities: No gross deformities, no edema, no tenderness  Neurologic: Normal motor function, Normal sensory function, No focal deficits  Skin: Warm, Dry, No erythema, No rash, No cyanosis, No mottling  Lymphatic: No lymphadenopathy in the following location(s): cervical  Psychiatric: Alert and oriented x3, Affect normal          EKG Interpretation    Interpreted by emergency department physician    Rhythm: sinus tachycardia  Rate: 100-110  Axis: normal  Ectopy: none  Conduction: normal  ST Segments: normal  T Waves: normal  Q Waves: none    Clinical Impression: Sinus tachycardia, otherwise unremarkable EKG        RADIOLOGY/PROCEDURES/LABS/MEDICATIONS ADMINISTERED:    I have reviewed and interpreted all of the currently available lab results from this visit (if applicable):  Results for orders placed or performed during the hospital encounter of 09/13/20   CBC auto diff   Result Value Ref Range    WBC 8.6 4.0 - 10.5 K/CU MM    RBC 5.06 4.2 - 5.4 M/CU MM    Hemoglobin 16.2 (H) 12.5 - 16.0 GM/DL    Hematocrit 47.9 (H) 37 - 47 %    MCV 94.7 78 - 100 FL    MCH 32.0 (H) 27 - 31 PG    MCHC 33.8 32.0 - 36.0 %    RDW 13.6 11.7 - 14.9 %    Platelets 317 694 - 385 K/CU MM    MPV 10.0 7.5 - 11.1 FL    Differential Type AUTOMATED DIFFERENTIAL     Segs Relative 59.2 36 - 66 %    Lymphocytes % 24.7 24 - 44 %    Monocytes % 11.0 (H) 0 - 4 %    Eosinophils % 4.1 (H) 0 - 3 %    Basophils % 0.8 0 - 1 %    Segs Absolute 5.1 K/CU MM    Lymphocytes Absolute 2.1 K/CU MM    Monocytes Absolute 0.9 K/CU MM    Eosinophils Absolute 0.4 K/CU MM    Basophils Absolute 0.1 K/CU MM    Nucleated RBC % 0.0 %    Total Nucleated RBC 0.0 K/CU MM    Total Immature Neutrophil 0.02 K/CU MM    Immature Neutrophil % 0.2 0 - 0.43 %   SPECIMEN REJECTION   Result Value Ref Range    Rejected Test TROT     Reason for Rejection UNABLE TO PERFORM TESTING:    SPECIMEN REJECTION   Result Value Ref Range    Rejected Test CMPR     Reason for Rejection UNABLE TO PERFORM TESTING:    Comprehensive Metabolic Panel   Result Value Ref Range    Sodium 140 135 - 145 MMOL/L    Potassium 4.2 3.5 - 5.1 MMOL/L    Chloride 103 99 - 110 mMol/L    CO2 24 21 - 32 MMOL/L    BUN 8 6 - 23 MG/DL    CREATININE 0.8 0.6 - 1.1 MG/DL    Glucose 97 70 - 99 MG/DL    Calcium 9.3 8.3 - 10.6 MG/DL    Alb 4.1 3.4 - 5.0 GM/DL    Total Protein 7.0 6.4 - 8.2 GM/DL    Total Bilirubin 0.2 0.0 - 1.0 MG/DL    ALT 28 10 - 40 U/L    AST 21 15 - 37 IU/L    Alkaline Phosphatase 63 40 - 129 IU/L    GFR Non-African American >60 >60 mL/min/1.73m2    GFR African American >60 >60 mL/min/1.73m2    Anion Gap 13 4 - 16   Troponin   Result Value Ref Range    Troponin T 0.026 (H) <0.01 NG/ML   Brain Natriuretic Peptide   Result Value Ref Range    Pro-BNP 45.65 <300 PG/ML ABNORMAL LABS:  Labs Reviewed   CBC WITH AUTO DIFFERENTIAL - Abnormal; Notable for the following components:       Result Value    Hemoglobin 16.2 (*)     Hematocrit 47.9 (*)     MCH 32.0 (*)     Monocytes % 11.0 (*)     Eosinophils % 4.1 (*)     All other components within normal limits   TROPONIN - Abnormal; Notable for the following components:    Troponin T 0.026 (*)     All other components within normal limits   SPECIMEN REJECTION   SPECIMEN REJECTION   COMPREHENSIVE METABOLIC PANEL   BRAIN NATRIURETIC PEPTIDE   COVID-19   D-DIMER, QUANTITATIVE         IMAGING STUDIES ORDERED:  XR CHEST PORTABLE  INSERT PERIPHERAL IV  TELEMETRY MONITORING  DROPLET PLUS ISOLATION  IP CONSULT TO HOSPITALIST    I have personally viewed the imaging studies. The radiologist interpretation is:  XR CHEST PORTABLE   Final Result   No acute process. MEDICATIONS ADMINISTERED:  Medications   promethazine (PHENERGAN) tablet 25 mg (25 mg Oral Given 9/13/20 1545)   acetaminophen (TYLENOL) tablet 650 mg (650 mg Oral Given 9/13/20 1545)   predniSONE (DELTASONE) tablet 50 mg (50 mg Oral Given 9/13/20 1545)   albuterol sulfate  (90 Base) MCG/ACT inhaler 2 puff (2 puffs Inhalation Given 9/13/20 1643)   albuterol sulfate  (90 Base) MCG/ACT inhaler 2 puff (2 puffs Inhalation Given 9/13/20 1824)         COURSE & MEDICAL DECISION MAKING  Last vitals: /87   Pulse 90   Temp 98.3 °F (36.8 °C) (Oral)   Resp 20   Ht 5' 4\" (1.626 m)   Wt (!) 331 lb (150.1 kg)   SpO2 96%   BMI 56.82 kg/m²     Patient presented with symptoms consistent with bronchitis. Likely secondary to acute viral illness. Given history and presentation cardiac workup initiated. Patient had labs, EKG, x-ray and troponin ordered. Patient was placed on cardiac monitor.      Differential diagnoses considered included, but were not limited to, acute coronary syndrome, pulmonary embolus, aortic dissection, pericarditis/cardiac follow-up provider specified. Disposition medications (if applicable):  New Prescriptions    No medications on file       ED Provider Disposition Time  DISPOSITION            Electronically signed by: Yasemin Schulz M.D., 9/13/2020 6:39 PM      This dictation was created with voice recognition software. While attempts have been made to review the dictation as it is transcribed, on occasion the spoken word can be misinterpreted by the technology leading to omissions or inappropriate words, phrases or sentences.         Wilbur Curtis MD  09/13/20 5883

## 2020-09-13 NOTE — ED NOTES
Pt brother Neelam Hatfield called for update, pt informed that he called and to call him back at [phone].        Lori Prieto RN  09/13/20 5638

## 2020-09-14 LAB
ALBUMIN SERPL-MCNC: 4.3 GM/DL (ref 3.4–5)
ALP BLD-CCNC: 67 IU/L (ref 40–128)
ALT SERPL-CCNC: 30 U/L (ref 10–40)
ANION GAP SERPL CALCULATED.3IONS-SCNC: 9 MMOL/L (ref 4–16)
APTT: 28.4 SECONDS (ref 25.1–37.1)
AST SERPL-CCNC: 18 IU/L (ref 15–37)
BASOPHILS ABSOLUTE: 0 K/CU MM
BASOPHILS RELATIVE PERCENT: 0.5 % (ref 0–1)
BILIRUB SERPL-MCNC: 0.2 MG/DL (ref 0–1)
BUN BLDV-MCNC: 8 MG/DL (ref 6–23)
CALCIUM SERPL-MCNC: 9.5 MG/DL (ref 8.3–10.6)
CHLORIDE BLD-SCNC: 104 MMOL/L (ref 99–110)
CO2: 24 MMOL/L (ref 21–32)
CREAT SERPL-MCNC: 0.7 MG/DL (ref 0.6–1.1)
DIFFERENTIAL TYPE: ABNORMAL
EOSINOPHILS ABSOLUTE: 0 K/CU MM
EOSINOPHILS RELATIVE PERCENT: 0 % (ref 0–3)
FIBRINOGEN LEVEL: 264 MG/DL (ref 196.9–442.1)
GFR AFRICAN AMERICAN: >60 ML/MIN/1.73M2
GFR NON-AFRICAN AMERICAN: >60 ML/MIN/1.73M2
GLUCOSE BLD-MCNC: 132 MG/DL (ref 70–99)
HCT VFR BLD CALC: 48.2 % (ref 37–47)
HEMOGLOBIN: 15.9 GM/DL (ref 12.5–16)
HIGH SENSITIVE C-REACTIVE PROTEIN: 4.3 MG/L
IMMATURE NEUTROPHIL %: 0.6 % (ref 0–0.43)
INR BLD: 0.98 INDEX
LACTATE DEHYDROGENASE: 190 IU/L (ref 120–246)
LYMPHOCYTES ABSOLUTE: 0.8 K/CU MM
LYMPHOCYTES RELATIVE PERCENT: 11.9 % (ref 24–44)
MCH RBC QN AUTO: 31.5 PG (ref 27–31)
MCHC RBC AUTO-ENTMCNC: 33 % (ref 32–36)
MCV RBC AUTO: 95.4 FL (ref 78–100)
MONOCYTES ABSOLUTE: 0.2 K/CU MM
MONOCYTES RELATIVE PERCENT: 3 % (ref 0–4)
NUCLEATED RBC %: 0 %
PDW BLD-RTO: 13.2 % (ref 11.7–14.9)
PLATELET # BLD: 329 K/CU MM (ref 140–440)
PMV BLD AUTO: 9.8 FL (ref 7.5–11.1)
POTASSIUM SERPL-SCNC: 4.8 MMOL/L (ref 3.5–5.1)
PROCALCITONIN: 0.05
PROTHROMBIN TIME: 11.8 SECONDS (ref 11.7–14.5)
RBC # BLD: 5.05 M/CU MM (ref 4.2–5.4)
SEGMENTED NEUTROPHILS ABSOLUTE COUNT: 5.4 K/CU MM
SEGMENTED NEUTROPHILS RELATIVE PERCENT: 84 % (ref 36–66)
SODIUM BLD-SCNC: 137 MMOL/L (ref 135–145)
TOTAL IMMATURE NEUTOROPHIL: 0.04 K/CU MM
TOTAL NUCLEATED RBC: 0 K/CU MM
TOTAL PROTEIN: 7 GM/DL (ref 6.4–8.2)
TROPONIN T: <0.01 NG/ML
TROPONIN T: <0.01 NG/ML
WBC # BLD: 6.4 K/CU MM (ref 4–10.5)

## 2020-09-14 PROCEDURE — 85730 THROMBOPLASTIN TIME PARTIAL: CPT

## 2020-09-14 PROCEDURE — 6370000000 HC RX 637 (ALT 250 FOR IP): Performed by: INTERNAL MEDICINE

## 2020-09-14 PROCEDURE — 83615 LACTATE (LD) (LDH) ENZYME: CPT

## 2020-09-14 PROCEDURE — G0378 HOSPITAL OBSERVATION PER HR: HCPCS

## 2020-09-14 PROCEDURE — 2580000003 HC RX 258: Performed by: INTERNAL MEDICINE

## 2020-09-14 PROCEDURE — 93010 ELECTROCARDIOGRAM REPORT: CPT | Performed by: INTERNAL MEDICINE

## 2020-09-14 PROCEDURE — 6360000002 HC RX W HCPCS: Performed by: INTERNAL MEDICINE

## 2020-09-14 PROCEDURE — 84484 ASSAY OF TROPONIN QUANT: CPT

## 2020-09-14 PROCEDURE — 85384 FIBRINOGEN ACTIVITY: CPT

## 2020-09-14 PROCEDURE — 86141 C-REACTIVE PROTEIN HS: CPT

## 2020-09-14 PROCEDURE — 94640 AIRWAY INHALATION TREATMENT: CPT

## 2020-09-14 PROCEDURE — 85025 COMPLETE CBC W/AUTO DIFF WBC: CPT

## 2020-09-14 PROCEDURE — 80053 COMPREHEN METABOLIC PANEL: CPT

## 2020-09-14 PROCEDURE — 2700000000 HC OXYGEN THERAPY PER DAY

## 2020-09-14 PROCEDURE — 94761 N-INVAS EAR/PLS OXIMETRY MLT: CPT

## 2020-09-14 PROCEDURE — 6370000000 HC RX 637 (ALT 250 FOR IP): Performed by: NURSE PRACTITIONER

## 2020-09-14 PROCEDURE — 1200000000 HC SEMI PRIVATE

## 2020-09-14 PROCEDURE — 85610 PROTHROMBIN TIME: CPT

## 2020-09-14 PROCEDURE — 84145 PROCALCITONIN (PCT): CPT

## 2020-09-14 PROCEDURE — 96376 TX/PRO/DX INJ SAME DRUG ADON: CPT

## 2020-09-14 RX ADMIN — Medication 2 PUFF: at 08:47

## 2020-09-14 RX ADMIN — FAMOTIDINE 20 MG: 20 TABLET, FILM COATED ORAL at 09:06

## 2020-09-14 RX ADMIN — METHYLPREDNISOLONE SODIUM SUCCINATE 40 MG: 40 INJECTION, POWDER, FOR SOLUTION INTRAMUSCULAR; INTRAVENOUS at 18:04

## 2020-09-14 RX ADMIN — ENOXAPARIN SODIUM 30 MG: 30 INJECTION SUBCUTANEOUS at 20:10

## 2020-09-14 RX ADMIN — Medication 2 PUFF: at 19:57

## 2020-09-14 RX ADMIN — ASPIRIN 81 MG CHEWABLE TABLET 81 MG: 81 TABLET CHEWABLE at 09:06

## 2020-09-14 RX ADMIN — ALBUTEROL SULFATE 2 PUFF: 108 AEROSOL, METERED RESPIRATORY (INHALATION) at 16:20

## 2020-09-14 RX ADMIN — ENOXAPARIN SODIUM 30 MG: 30 INJECTION SUBCUTANEOUS at 09:06

## 2020-09-14 RX ADMIN — METHYLPREDNISOLONE SODIUM SUCCINATE 40 MG: 40 INJECTION, POWDER, FOR SOLUTION INTRAMUSCULAR; INTRAVENOUS at 12:28

## 2020-09-14 RX ADMIN — ACETAMINOPHEN 650 MG: 325 TABLET ORAL at 21:02

## 2020-09-14 RX ADMIN — GUAIFENESIN AND DEXTROMETHORPHAN 5 ML: 100; 10 SYRUP ORAL at 20:10

## 2020-09-14 RX ADMIN — ALBUTEROL SULFATE 2 PUFF: 108 AEROSOL, METERED RESPIRATORY (INHALATION) at 08:47

## 2020-09-14 RX ADMIN — SODIUM CHLORIDE, PRESERVATIVE FREE 10 ML: 5 INJECTION INTRAVENOUS at 09:08

## 2020-09-14 RX ADMIN — ALBUTEROL SULFATE 2 PUFF: 108 AEROSOL, METERED RESPIRATORY (INHALATION) at 12:28

## 2020-09-14 RX ADMIN — AZITHROMYCIN MONOHYDRATE 500 MG: 500 INJECTION, POWDER, LYOPHILIZED, FOR SOLUTION INTRAVENOUS at 23:41

## 2020-09-14 RX ADMIN — ACETAMINOPHEN 650 MG: 325 TABLET ORAL at 05:05

## 2020-09-14 RX ADMIN — FAMOTIDINE 20 MG: 20 TABLET, FILM COATED ORAL at 20:10

## 2020-09-14 RX ADMIN — Medication 2 PUFF: at 12:28

## 2020-09-14 RX ADMIN — SODIUM CHLORIDE, PRESERVATIVE FREE 10 ML: 5 INJECTION INTRAVENOUS at 20:10

## 2020-09-14 RX ADMIN — METHYLPREDNISOLONE SODIUM SUCCINATE 40 MG: 40 INJECTION, POWDER, FOR SOLUTION INTRAMUSCULAR; INTRAVENOUS at 23:41

## 2020-09-14 RX ADMIN — Medication 2 PUFF: at 16:20

## 2020-09-14 RX ADMIN — METHYLPREDNISOLONE SODIUM SUCCINATE 40 MG: 40 INJECTION, POWDER, FOR SOLUTION INTRAMUSCULAR; INTRAVENOUS at 05:04

## 2020-09-14 RX ADMIN — ALBUTEROL SULFATE 2 PUFF: 108 AEROSOL, METERED RESPIRATORY (INHALATION) at 19:56

## 2020-09-14 RX ADMIN — MELATONIN 3 MG: at 20:10

## 2020-09-14 ASSESSMENT — PAIN SCALES - GENERAL
PAINLEVEL_OUTOF10: 0
PAINLEVEL_OUTOF10: 0
PAINLEVEL_OUTOF10: 5
PAINLEVEL_OUTOF10: 0
PAINLEVEL_OUTOF10: 5
PAINLEVEL_OUTOF10: 0

## 2020-09-14 ASSESSMENT — PAIN DESCRIPTION - ORIENTATION: ORIENTATION: MID

## 2020-09-14 ASSESSMENT — PAIN DESCRIPTION - FREQUENCY: FREQUENCY: CONTINUOUS

## 2020-09-14 ASSESSMENT — PAIN DESCRIPTION - ONSET: ONSET: AWAKENED FROM SLEEP

## 2020-09-14 ASSESSMENT — PAIN DESCRIPTION - PAIN TYPE: TYPE: ACUTE PAIN

## 2020-09-14 ASSESSMENT — PAIN DESCRIPTION - PROGRESSION: CLINICAL_PROGRESSION: NOT CHANGED

## 2020-09-14 ASSESSMENT — PAIN - FUNCTIONAL ASSESSMENT: PAIN_FUNCTIONAL_ASSESSMENT: ACTIVITIES ARE NOT PREVENTED

## 2020-09-14 ASSESSMENT — PAIN DESCRIPTION - LOCATION: LOCATION: HEAD

## 2020-09-14 ASSESSMENT — PAIN DESCRIPTION - DESCRIPTORS: DESCRIPTORS: ACHING;HEADACHE

## 2020-09-14 NOTE — ACP (ADVANCE CARE PLANNING)
Advance Care Planning     Advance Care Planning Activator (Inpatient)  Conversation Note      Date of ACP Conversation: 9/13/2020    Conversation Conducted with: Patient with Decision Making Capacity    ACP Activator: Kami Mercadodaksha    *When Decision Maker makes decisions on behalf of the incapacitated patient: Decision Maker is asked to consider and make decisions based on patient values, known preferences, or best interests. Health Care Decision Maker:     Pt names her mother, but does not know her # & does not tell LSW her name. LSW stresses importance of this and pt does not want to talk @ this time. Current Designated Health Care Decision Maker:   (If there is a valid [place] named in the [address] [facility] M[facility]\" box in the ACP activity, but it is not visible above, be sure to open that field and then select the health care decision maker relationship (ie \"primary\") in the blank space to the right of the name.) Validate  this information as still accurate & up-to-date; edit [place] field as needed.)    Note: Assess and validate information in current ACP documents, as indicated. Care Preferences    Ventilation: \"If you were in your present state of health and suddenly became very ill and were unable to breathe on your own, what would your preference be about the use of a ventilator (breathing machine) if it were available to you? \"      Would the patient desire the use of ventilator (breathing machine)?: yes    \"If your health worsens and it becomes clear that your chance of recovery is unlikely, what would your preference be about the use of a ventilator (breathing machine) if it were available to you? \"     Would the patient desire the use of ventilator (breathing machine)?: Yes      Resuscitation  \"CPR works best to restart the heart when there is a sudden event, like a heart attack, in someone who is otherwise healthy.  Unfortunately, CPR does not typically restart the heart for people who have serious health conditions or who are very sick. \"    \"In the event your heart stopped as a result of an underlying serious health condition, would you want attempts to be made to restart your heart (answer \"yes\" for attempt to resuscitate) or would you prefer a natural death (answer \"no\" for do not attempt to resuscitate)? \" yes      NOTE: If the patient has a valid advance directive AND now provides care preference(s) that are inconsistent with that prior directive, advise the patient to consider either: creating a new advance directive that complies with state-specific requirements; or, if that is not possible, orally revoking that prior directive in accordance with state-specific requirements, which must be documented in the EHR. [] Yes   [] No   Educated Patient / Rita Martinez regarding differences between Advance Directives and portable DNR orders.     Length of ACP Conversation in minutes:  9    Conversation Outcomes:  [x] ACP discussion completed  [] Existing advance directive reviewed with patient; no changes to patient's previously recorded wishes  [] New Advance Directive completed  [] Portable Do Not Rescitate prepared for Provider review and signature  [] POLST/POST/MOLST/MOST prepared for Provider review and signature      Follow-up plan:    [] Schedule follow-up conversation to continue planning  [] Referred individual to Provider for additional questions/concerns   [] Advised patient/agent/surrogate to review completed ACP document and update if needed with changes in condition, patient preferences or care setting    [x] This note routed to one or more involved healthcare providers

## 2020-09-14 NOTE — PROGRESS NOTES
Hospitalist Progress Note      Name:  Dominic Jernigan /Age/Sex: 1985  (38 y.o. female)   MRN & CSN:  [id number] & [id number] Admission Date/Time: 2020  3:05 PM   Location:  -A PCP: Faustino Rene MD         Hospital Day: 2    Assessment and Plan:   Dominic Jernigan is a 38 y.o.  female with cough, shortness of breath and chest pain    #Cough, shortness of breath and chest pain: URI  #Acute hypoxic respiratory failure   #Suspected COVID  #COPD exacerbation: History of smoking and wheezing  -Patient is hypoxic, requiring 2 to 3 L of oxygen  -Chest x-ray no acute pathology  -Respiratory PCR positive for rhinovirus  -Continue supportive management  -Continue breathing treatment  -Continue Solu-Medrol and azithromycin  -COVID result pending   -Pulmonology consulted     #Marginally elevated troponin  -initial troponin 0 0.026, repeat troponin is less than 0.01   -Patient is asymptomatic   -EKG with sinus arrhythmia no acute ST-T changes  -on ASA, nitro PRN  -Consider consult cardiology if symptoms increase    History of smoking  -Patient has smoked for 28 pack years  -Risks of smoking and benefits of quitting discussed with the patient.  -NicoDerm      #Morbid obesity -BMI 56 -counseling on diet and exercise to lose weight    Diet DIET GENERAL;   DVT Prophylaxis [] Lovenox, []  Heparin, [] SCDs, [] Ambulation   GI Prophylaxis [] PPI,  [] H2 Blocker,  [] Carafate,  [] Diet/Tube Feeds   Code Status Full Code   Disposition Patient requires continued admission due to ARF   MDM [] Low, [] Moderate,[]  High  Patient's risk as above due to      History of Present Illness:     Chief Complaint: <principal problem not specified>  Dominic Jernigan is a 38 y.o.  female  who presents with ER       Ten point ROS reviewed negative, unless as noted above    Objective:        Intake/Output Summary (Last 24 hours) at 2020 1518  Last data filed at 2020 0944  Gross per 24 hour   Intake 250 ml Output --   Net 250 ml      Vitals:   Vitals:    09/14/20 0756   BP: 121/80   Pulse: 84   Resp: 23   Temp: 98.1 °F (36.7 °C)   SpO2: 93%     Physical Exam:   GEN Awake female, sitting upright in bed in no apparent distress. Appears given age. EYES Pupils are equally round. No scleral erythema, discharge, or conjunctivitis. HENT Mucous membranes are moist. Oral pharynx without exudates, no evidence of thrush. NECK Supple, no apparent thyromegaly or masses. RESP Clear to auscultation, no wheezes, rales or rhonchi. Symmetric chest movement while on room air. CARDIO/VASC S1/S2 auscultated. Regular rate without appreciable murmurs, rubs, or gallops. No JVD or carotid bruits. Peripheral pulses equal bilaterally and palpable. No peripheral edema. GI Abdomen is soft without significant tenderness, masses, or guarding. Bowel sounds are normoactive. Rectal exam deferred.  No costovertebral angle tenderness. Normal appearing external genitalia. Patterson catheter is not present. HEME/LYMPH No palpable cervical lymphadenopathy and no hepatosplenomegaly. No petechiae or ecchymoses. MSK No gross joint deformities. SKIN Normal coloration, warm, dry. NEURO Cranial nerves appear grossly intact, normal speech, no lateralizing weakness. PSYCH Awake, alert, oriented x 4. Affect appropriate.     Medications:   Medications:    sodium chloride flush  10 mL Intravenous 2 times per day    famotidine  20 mg Oral BID    enoxaparin  30 mg Subcutaneous BID    nicotine  1 patch Transdermal Daily    albuterol sulfate HFA  2 puff Inhalation 4x daily    And    ipratropium  2 puff Inhalation 4x daily    methylPREDNISolone  40 mg Intravenous Q6H    azithromycin  500 mg Intravenous Q24H    aspirin  81 mg Oral Daily      Infusions:   PRN Meds: sodium chloride flush, 10 mL, PRN  acetaminophen, 650 mg, Q6H PRN    Or  acetaminophen, 650 mg, Q6H PRN  polyethylene glycol, 17 g, Daily PRN  promethazine, 12.5 mg, Q6H PRN Or  ondansetron, 4 mg, Q6H PRN  potassium chloride, 40 mEq, PRN    Or  potassium alternative oral replacement, 40 mEq, PRN    Or  potassium chloride, 10 mEq, PRN  nitroGLYCERIN, 0.4 mg, Q5 Min PRN  melatonin, 3 mg, Nightly PRN  guaiFENesin-dextromethorphan, 5 mL, Q4H PRN          Electronically signed by Karissa Araujo MD on 9/14/2020 at 3:18 PM

## 2020-09-14 NOTE — ED NOTES
Attempted to call report, they are calling this RN back in 5 minutes     Florian Croft RN  09/13/20 8820

## 2020-09-14 NOTE — PLAN OF CARE
Problem: Anxiety:  Goal: Level of anxiety will decrease  Description: Level of anxiety will decrease  Outcome: Ongoing     Problem: Breathing Pattern - Ineffective:  Goal: Ability to achieve and maintain a regular respiratory rate will improve  Description: Ability to achieve and maintain a regular respiratory rate will improve  Outcome: Ongoing     Problem:  Activity:  Goal: Fatigue will decrease  Description: Fatigue will decrease  Outcome: Ongoing     Problem: Cardiac:  Goal: Hemodynamic stability will improve  Description: Hemodynamic stability will improve  Outcome: Ongoing     Problem: Coping:  Goal: Level of anxiety will decrease  Description: Level of anxiety will decrease  Outcome: Ongoing  Goal: Ability to cope will improve  Description: Ability to cope will improve  Outcome: Ongoing  Goal: Ability to establish a method of communication will improve  Description: Ability to establish a method of communication will improve  Outcome: Ongoing     Problem: Nutritional:  Goal: Consumption of the prescribed amount of daily calories will improve  Description: Consumption of the prescribed amount of daily calories will improve  Outcome: Ongoing     Problem: Respiratory:  Goal: Ability to maintain a clear airway will improve  Description: Ability to maintain a clear airway will improve  Outcome: Ongoing  Goal: Ability to maintain adequate ventilation will improve  Description: Ability to maintain adequate ventilation will improve  Outcome: Ongoing  Goal: Complications related to the disease process, condition or treatment will be avoided or minimized  Description: Complications related to the disease process, condition or treatment will be avoided or minimized  Outcome: Ongoing     Problem: Skin Integrity:  Goal: Risk for impaired skin integrity will decrease  Description: Risk for impaired skin integrity will decrease  Outcome: Ongoing

## 2020-09-14 NOTE — CONSULTS
Subjective:   CHIEF COMPLAINT / HPI:  38year old female who is a smoker for 28 years and h/o asthma admitted with worsening sob and this is accompanied by wheezing. She has cough with yellow sputum. She denies any fever or hemoptysis.       Past Medical History:  Past Medical History:   Diagnosis Date    Asthma     as a child    Shock ([address]     post partum       Past Surgical History:        Procedure Laterality Date     SECTION, LOW TRANSVERSE  ,     COLONOSCOPY  2019    polyps x5, Internal grade 1 hemorrhoids    COLONOSCOPY N/A 3/19/2019    COLONOSCOPY CONTROL HEMORRHAGE with polypectomy performed by Bryce Salvador MD at [place] [address]      3-4    HEMORRHOID SURGERY         Current Medications:    Current Facility-Administered Medications: sodium chloride flush 0.9 % injection 10 mL, 10 mL, Intravenous, 2 times per day  sodium chloride flush 0.9 % injection 10 mL, 10 mL, Intravenous, PRN  acetaminophen (TYLENOL) tablet 650 mg, 650 mg, Oral, Q6H PRN **OR** acetaminophen (TYLENOL) suppository 650 mg, 650 mg, Rectal, Q6H PRN  polyethylene glycol (GLYCOLAX) packet 17 g, 17 g, Oral, Daily PRN  promethazine (PHENERGAN) tablet 12.5 mg, 12.5 mg, Oral, Q6H PRN **OR** ondansetron (ZOFRAN) injection 4 mg, 4 mg, Intravenous, Q6H PRN  famotidine (PEPCID) tablet 20 mg, 20 mg, Oral, BID  enoxaparin (LOVENOX) injection 30 mg, 30 mg, Subcutaneous, BID  potassium chloride (KLOR-CON M) extended release tablet 40 mEq, 40 mEq, Oral, PRN **OR** potassium bicarb-citric acid (EFFER-K) effervescent tablet 40 mEq, 40 mEq, Oral, PRN **OR** potassium chloride 10 mEq/100 mL IVPB (Peripheral Line), 10 mEq, Intravenous, PRN  nicotine (NICODERM CQ) 21 MG/24HR 1 patch, 1 patch, Transdermal, Daily  albuterol sulfate  (90 Base) MCG/ACT inhaler 2 puff, 2 puff, Inhalation, 4x daily **AND** ipratropium (ATROVENT HFA) 17 MCG/ACT inhaler 2 puff, 2 puff, Inhalation, 4x daily **AND** MDI file       Family History:   Family History   Problem Relation Age of Onset    Colon Cancer Mother 45    Heart Attack Father        Immunization:    There is no immunization history on file for this patient. REVIEW OF SYSTEMS:    CONSTITUTIONAL:  negative for fevers, chills, diaphoresis, activity change, appetite change, fatigue, night sweats and unexpected weight change. EYES:  negative for blurred vision, eye discharge, visual disturbance and icterus  HEENT:  negative for hearing loss, tinnitus, ear drainage, sinus pressure, nasal congestion, epistaxis and snoring  RESPIRATORY:  See HPI  CARDIOVASCULAR:  negative for chest pain, palpitations, exertional chest pressure/discomfort, edema, syncope  GASTROINTESTINAL:  negative for nausea, vomiting, diarrhea, constipation, blood in stool and abdominal pain  GENITOURINARY:  negative for frequency, dysuria and hematuria  HEMATOLOGIC/LYMPHATIC:  negative for easy bruising, bleeding and lymphadenopathy  ALLERGIC/IMMUNOLOGIC:  negative for recurrent infections, angioedema, anaphylaxis and drug reactions  ENDOCRINE:  negative for weight changes and diabetic symptoms including polyuria, polydipsia and polyphagia    MUSCULOSKELETAL:  negative for  pain, joint swelling, decreased range of motion and muscle weakness  NEUROLOGICAL:  negative for headaches, slurred speech, unilateral weakness  PSYCHIATRIC/BEHAVIORAL: negative for hallucinations, behavioral problems, confusion and agitation.      Objective:   PHYSICAL EXAM:      VITALS:    Vitals:    09/13/20 2222 09/14/20 0440 09/14/20 0441 09/14/20 0756   BP:  124/82  121/80   Pulse: 116 83 89 84   Resp:  13 18 23   Temp:  98.2 °F (36.8 °C)  98.1 °F (36.7 °C)   TempSrc:  Oral  Oral   SpO2:  94% 93% 93%   Weight:  (!) 347 lb 7.1 oz (157.6 kg)     Height:             CONSTITUTIONAL:  awake, alert, cooperative, no apparent distress, and appears stated age  NECK:  Supple, symmetrical, trachea midline, no adenopathy, thyroid

## 2020-09-14 NOTE — CARE COORDINATION
9/13 COVID pending. CM called pt to initiate a safe discharge plan. Cm called introduced self and explained role of CM. Pt is kind, alert and oriented. Pt lives at home with her 5 children. Pt is independent for all her needs. She is supported by friends that will help with children, meals and care. Pt does not have a PCP. CM placed the [facility] walk in clinic and pcp list.on discharge instructions. Pt has insurance and is able to obtain her prescriptions. Discharge plan is for pt to return home with no needs. Supported by friends. Resources given. CM provided card and encouraged to call for any needs or concern. CM is available if any needs arise.  [address]

## 2020-09-15 LAB
APTT: 21.1 SECONDS (ref 25.1–37.1)
BASOPHILS ABSOLUTE: 0 K/CU MM
BASOPHILS RELATIVE PERCENT: 0.1 % (ref 0–1)
DIFFERENTIAL TYPE: ABNORMAL
EOSINOPHILS ABSOLUTE: 0 K/CU MM
EOSINOPHILS RELATIVE PERCENT: 0.1 % (ref 0–3)
FIBRINOGEN LEVEL: 207 MG/DL (ref 196.9–442.1)
HCT VFR BLD CALC: 48.1 % (ref 37–47)
HEMOGLOBIN: 16.4 GM/DL (ref 12.5–16)
IMMATURE NEUTROPHIL %: 0.5 % (ref 0–0.43)
INR BLD: 0.89 INDEX
LEGIONELLA URINARY AG: NEGATIVE
LYMPHOCYTES ABSOLUTE: 1.2 K/CU MM
LYMPHOCYTES RELATIVE PERCENT: 9.1 % (ref 24–44)
MCH RBC QN AUTO: 32 PG (ref 27–31)
MCHC RBC AUTO-ENTMCNC: 31.9 % (ref 32–36)
MCV RBC AUTO: 95.9 FL (ref 78–100)
MONOCYTES ABSOLUTE: 0.6 K/CU MM
MONOCYTES RELATIVE PERCENT: 4.4 % (ref 0–4)
NUCLEATED RBC %: 0 %
PDW BLD-RTO: 13.7 % (ref 11.7–14.9)
PLATELET # BLD: 265 K/CU MM (ref 140–440)
PMV BLD AUTO: 10.4 FL (ref 7.5–11.1)
PROTHROMBIN TIME: 10.8 SECONDS (ref 11.7–14.5)
RBC # BLD: 5.56 M/CU MM (ref 4.2–5.4)
REASON FOR REJECTION: NORMAL
REJECTED TEST: NORMAL
SARS-COV-2: NOT DETECTED
SEGMENTED NEUTROPHILS ABSOLUTE COUNT: 11.3 K/CU MM
SEGMENTED NEUTROPHILS RELATIVE PERCENT: 85.8 % (ref 36–66)
SOURCE: NORMAL
STREP PNEUMONIAE ANTIGEN: NORMAL
TOTAL IMMATURE NEUTOROPHIL: 0.06 K/CU MM
TOTAL NUCLEATED RBC: 0 K/CU MM
WBC # BLD: 13.1 K/CU MM (ref 4–10.5)

## 2020-09-15 PROCEDURE — 1200000000 HC SEMI PRIVATE

## 2020-09-15 PROCEDURE — 80053 COMPREHEN METABOLIC PANEL: CPT

## 2020-09-15 PROCEDURE — 86141 C-REACTIVE PROTEIN HS: CPT

## 2020-09-15 PROCEDURE — 85610 PROTHROMBIN TIME: CPT

## 2020-09-15 PROCEDURE — 6370000000 HC RX 637 (ALT 250 FOR IP): Performed by: INTERNAL MEDICINE

## 2020-09-15 PROCEDURE — 85025 COMPLETE CBC W/AUTO DIFF WBC: CPT

## 2020-09-15 PROCEDURE — 94761 N-INVAS EAR/PLS OXIMETRY MLT: CPT

## 2020-09-15 PROCEDURE — 2700000000 HC OXYGEN THERAPY PER DAY

## 2020-09-15 PROCEDURE — G0378 HOSPITAL OBSERVATION PER HR: HCPCS

## 2020-09-15 PROCEDURE — 6360000002 HC RX W HCPCS: Performed by: INTERNAL MEDICINE

## 2020-09-15 PROCEDURE — 94640 AIRWAY INHALATION TREATMENT: CPT

## 2020-09-15 PROCEDURE — 84145 PROCALCITONIN (PCT): CPT

## 2020-09-15 PROCEDURE — 96376 TX/PRO/DX INJ SAME DRUG ADON: CPT

## 2020-09-15 PROCEDURE — 85730 THROMBOPLASTIN TIME PARTIAL: CPT

## 2020-09-15 PROCEDURE — 96366 THER/PROPH/DIAG IV INF ADDON: CPT

## 2020-09-15 PROCEDURE — 2580000003 HC RX 258: Performed by: INTERNAL MEDICINE

## 2020-09-15 PROCEDURE — 85384 FIBRINOGEN ACTIVITY: CPT

## 2020-09-15 RX ORDER — METHYLPREDNISOLONE SODIUM SUCCINATE 40 MG/ML
40 INJECTION, POWDER, LYOPHILIZED, FOR SOLUTION INTRAMUSCULAR; INTRAVENOUS EVERY 12 HOURS
Status: DISCONTINUED | OUTPATIENT
Start: 2020-09-15 | End: 2020-09-16 | Stop reason: HOSPADM

## 2020-09-15 RX ORDER — ALBUTEROL SULFATE 90 UG/1
2 AEROSOL, METERED RESPIRATORY (INHALATION) EVERY 6 HOURS PRN
Qty: 1 INHALER | Refills: 3 | Status: SHIPPED | OUTPATIENT
Start: 2020-09-15 | End: 2020-09-16

## 2020-09-15 RX ORDER — ASPIRIN 81 MG/1
81 TABLET, CHEWABLE ORAL DAILY
Qty: 30 TABLET | Refills: 3 | Status: SHIPPED | OUTPATIENT
Start: 2020-09-16 | End: 2021-07-27 | Stop reason: ALTCHOICE

## 2020-09-15 RX ORDER — PREDNISONE 10 MG/1
10 TABLET ORAL DAILY
Qty: 44 TABLET | Refills: 0 | Status: SHIPPED | OUTPATIENT
Start: 2020-09-15 | End: 2020-09-29

## 2020-09-15 RX ORDER — AZITHROMYCIN 500 MG/1
500 TABLET, FILM COATED ORAL DAILY
Qty: 3 TABLET | Refills: 0 | Status: SHIPPED | OUTPATIENT
Start: 2020-09-15 | End: 2020-09-18

## 2020-09-15 RX ADMIN — ALBUTEROL SULFATE 2 PUFF: 108 AEROSOL, METERED RESPIRATORY (INHALATION) at 07:31

## 2020-09-15 RX ADMIN — FAMOTIDINE 20 MG: 20 TABLET, FILM COATED ORAL at 20:37

## 2020-09-15 RX ADMIN — Medication 2 PUFF: at 19:13

## 2020-09-15 RX ADMIN — ACETAMINOPHEN 650 MG: 325 TABLET ORAL at 03:59

## 2020-09-15 RX ADMIN — Medication 2 PUFF: at 11:27

## 2020-09-15 RX ADMIN — Medication 2 PUFF: at 15:53

## 2020-09-15 RX ADMIN — ACETAMINOPHEN 650 MG: 325 TABLET ORAL at 18:52

## 2020-09-15 RX ADMIN — AZITHROMYCIN MONOHYDRATE 500 MG: 500 INJECTION, POWDER, LYOPHILIZED, FOR SOLUTION INTRAVENOUS at 23:13

## 2020-09-15 RX ADMIN — ENOXAPARIN SODIUM 30 MG: 30 INJECTION SUBCUTANEOUS at 20:37

## 2020-09-15 RX ADMIN — ASPIRIN 81 MG CHEWABLE TABLET 81 MG: 81 TABLET CHEWABLE at 09:36

## 2020-09-15 RX ADMIN — FAMOTIDINE 20 MG: 20 TABLET, FILM COATED ORAL at 09:36

## 2020-09-15 RX ADMIN — ALBUTEROL SULFATE 2 PUFF: 108 AEROSOL, METERED RESPIRATORY (INHALATION) at 15:53

## 2020-09-15 RX ADMIN — METHYLPREDNISOLONE SODIUM SUCCINATE 40 MG: 40 INJECTION, POWDER, FOR SOLUTION INTRAMUSCULAR; INTRAVENOUS at 17:42

## 2020-09-15 RX ADMIN — Medication 2 PUFF: at 07:31

## 2020-09-15 RX ADMIN — METHYLPREDNISOLONE SODIUM SUCCINATE 40 MG: 40 INJECTION, POWDER, FOR SOLUTION INTRAMUSCULAR; INTRAVENOUS at 05:40

## 2020-09-15 RX ADMIN — ENOXAPARIN SODIUM 30 MG: 30 INJECTION SUBCUTANEOUS at 09:36

## 2020-09-15 RX ADMIN — ALBUTEROL SULFATE 2 PUFF: 108 AEROSOL, METERED RESPIRATORY (INHALATION) at 19:12

## 2020-09-15 RX ADMIN — SODIUM CHLORIDE, PRESERVATIVE FREE 10 ML: 5 INJECTION INTRAVENOUS at 09:36

## 2020-09-15 RX ADMIN — SODIUM CHLORIDE, PRESERVATIVE FREE 10 ML: 5 INJECTION INTRAVENOUS at 20:37

## 2020-09-15 RX ADMIN — ALBUTEROL SULFATE 2 PUFF: 108 AEROSOL, METERED RESPIRATORY (INHALATION) at 11:26

## 2020-09-15 ASSESSMENT — PAIN SCALES - GENERAL
PAINLEVEL_OUTOF10: 0
PAINLEVEL_OUTOF10: 4
PAINLEVEL_OUTOF10: 0
PAINLEVEL_OUTOF10: 3
PAINLEVEL_OUTOF10: 0

## 2020-09-15 ASSESSMENT — PAIN DESCRIPTION - PAIN TYPE: TYPE: ACUTE PAIN

## 2020-09-15 ASSESSMENT — PAIN DESCRIPTION - LOCATION: LOCATION: HEAD

## 2020-09-15 NOTE — PROGRESS NOTES
pulmonary      SUBJECTIVE: she feels much improved     OBJECTIVE    VITALS:  /76   Pulse 80   Temp 98.1 °F (36.7 °C) (Oral)   Resp 13   Ht 5' 4\" (1.626 m)   Wt (!) 347 lb 14.2 oz (157.8 kg)   SpO2 96%   BMI 59.71 kg/m²   HEAD AND FACE EXAM:  No throat injection, no active exudate,no thrush  NECK EXAM;No JVD, no masses, symmetrical  CHEST EXAM; Expansion equal and symmetrical, no masses  LUNG EXAM; Good breath sounds bilaterally. There are expiratory wheezes both lungs, there are crackles at both lung bases  CARDIOVASCULAR EXAM: Positive S1 and S2, no S3 or S4, no clicks ,no murmurs  RIGHT AND LEFT LOWER EXTRIMITY EXAM: No edema, no swelling, no inflamation  CNS EXAM: Alert and oriented X3          LABS   Lab Results   Component Value Date    WBC 13.1 (H) 09/15/2020    HGB 16.4 (H) 09/15/2020    HCT 48.1 (H) 09/15/2020    MCV 95.9 09/15/2020     09/15/2020     Lab Results   Component Value Date    CREATININE 0.7 09/14/2020    BUN 8 09/14/2020     09/14/2020    K 4.8 09/14/2020     09/14/2020    CO2 24 09/14/2020     Lab Results   Component Value Date    INR 0.89 09/15/2020    PROTIME 10.8 (L) 09/15/2020        No results found for: PHOS   No results for input(s): PH, PO2ART, NCX5ZRN, HCO3, BEART, O2SAT in the last 72 hours. Wt Readings from Last 3 Encounters:   09/15/20 (!) 347 lb 14.2 oz (157.8 kg)   01/17/20 240 lb (108.9 kg)   10/23/19 235 lb (106.6 kg)               ASSESMENT  Ac asthma        PLAN  1. cpm  2.  Taper steroids    9/15/2020  Lizzie Guerrero M.D.

## 2020-09-15 NOTE — PROGRESS NOTES
Hospitalist Progress Note      Name:  Talia Valenzuela /Age/Sex: 1985  (38 y.o. female)   MRN & CSN:  [id number] & [id number] Admission Date/Time: 2020  3:05 PM   Location:  -A PCP: Rickie Mendez MD         Hospital Day: 3    History of Present Illness:     Chief Complaint: Talia Valenzuela is a 38 y.o.  female  who presents with chest pain     The patient seen and examined at bed side. She says feeling better today. Her breathing has improved. Ten point ROS reviewed negative, unless as noted above    Objective:        Intake/Output Summary (Last 24 hours) at 9/15/2020 1718  Last data filed at 9/15/2020 1322  Gross per 24 hour   Intake 730 ml   Output --   Net 730 ml      Vitals:   Vitals:    09/15/20 1542   BP: (!) 141/87   Pulse: 87   Resp: 14   Temp: 98.5 °F (36.9 °C)   SpO2: 95%     Physical Exam:   General Appearance: alert and oriented to person, place and time, in no acute distress  Cardiovascular: normal rate, regular rhythm, normal S1 and S2  Pulmonary/Chest: Decreased breath sounds bilaterally with occasional wheezing  Abdomen: soft, non-tender, non-distended, normal bowel sounds, no masses   Extremities: no cyanosis, clubbing or edema, pulse   Skin: warm and dry, no rash or erythema  Head: normocephalic and atraumatic  Eyes: pupils equal, round, and reactive to light  Neck: supple and non-tender without mass, no thyromegaly   Musculoskeletal: normal range of motion, no joint swelling, deformity or tenderness  Neurological: alert, oriented, normal speech, no focal findings or movement disorder noted    Medications:   Medications:    methylPREDNISolone  40 mg Intravenous Q12H    sodium chloride flush  10 mL Intravenous 2 times per day    famotidine  20 mg Oral BID    enoxaparin  30 mg Subcutaneous BID    nicotine  1 patch Transdermal Daily    albuterol sulfate HFA  2 puff Inhalation 4x daily    And    ipratropium  2 puff Inhalation 4x daily    azithromycin  500 mg Intravenous Q24H    aspirin  81 mg Oral Daily      Infusions:   PRN Meds: sodium chloride flush, 10 mL, PRN  acetaminophen, 650 mg, Q6H PRN    Or  acetaminophen, 650 mg, Q6H PRN  polyethylene glycol, 17 g, Daily PRN  promethazine, 12.5 mg, Q6H PRN    Or  ondansetron, 4 mg, Q6H PRN  potassium chloride, 40 mEq, PRN    Or  potassium alternative oral replacement, 40 mEq, PRN    Or  potassium chloride, 10 mEq, PRN  nitroGLYCERIN, 0.4 mg, Q5 Min PRN  melatonin, 3 mg, Nightly PRN  guaiFENesin-dextromethorphan, 5 mL, Q4H PRN            Pertinent New Labs & Imaging Studies     CBC with Differential:    Lab Results   Component Value Date    WBC 13.1 09/15/2020    RBC 5.56 09/15/2020    HGB 16.4 09/15/2020    HCT 48.1 09/15/2020     09/15/2020    MCV 95.9 09/15/2020    MCH 32.0 09/15/2020    MCHC 31.9 09/15/2020    RDW 13.7 09/15/2020    SEGSPCT 85.8 09/15/2020    LYMPHOPCT 9.1 09/15/2020    MONOPCT 4.4 09/15/2020    BASOPCT 0.1 09/15/2020    MONOSABS 0.6 09/15/2020    LYMPHSABS 1.2 09/15/2020    EOSABS 0.0 09/15/2020    BASOSABS 0.0 09/15/2020    DIFFTYPE AUTOMATED DIFFERENTIAL 09/15/2020     CMP:    Lab Results   Component Value Date     09/14/2020    K 4.8 09/14/2020     09/14/2020    CO2 24 09/14/2020    BUN 8 09/14/2020    CREATININE 0.7 09/14/2020    GFRAA >60 09/14/2020    AGRATIO 1.7 01/23/2019    LABGLOM >60 09/14/2020    GLUCOSE 132 09/14/2020    PROT 7.0 09/14/2020    LABALBU 4.3 09/14/2020    CALCIUM 9.5 09/14/2020    BILITOT 0.2 09/14/2020    ALKPHOS 67 09/14/2020    AST 18 09/14/2020    ALT 30 09/14/2020     Xr Chest Portable    Result Date: 9/13/2020  EXAMINATION: ONE XRAY VIEW OF THE CHEST 9/13/2020 3:45 pm COMPARISON: Chest radiograph 01/17/2020. HISTORY: ORDERING SYSTEM PROVIDED HISTORY: shortness of breath. cough TECHNOLOGIST PROVIDED HISTORY: Reason for exam:->shortness of breath. cough Reason for Exam: shortness of breath.  cough Acuity: Acute Type of Exam: Initial Additional signs and symptoms: none Relevant Medical/Surgical History: asthma FINDINGS: The lungs are without acute focal process. There is no effusion or pneumothorax. The cardiomediastinal silhouette is without acute process. The osseous structures are without acute process. No acute process.        Assessment and Plan:   Coral Alves is a 38 y.o.  female  who presents with chest pain    Hypoxemic respiratory failure  COPD/asthma exacerbation  Currently oxygen requirements improved  Chest x-ray with no acute pathology  Respiratory PCR positive for rhinovirus  Pulmonary following appreciate recommendations, would like to keep her 1 more day  COVID-19 test negative hence will be transferred out  Continue prednisone and azithromycin  Did ambulatory pulse ox, with no dropping of oxygen below 89%    Elevated Troponin  Likely Type 2 elevation with hypoxic stress    Tobacco abuse  Advised to quit    Transfer out of COVID unit, can be discharged tomorrow if okay with Pulmonary    Diet DIET GENERAL;   DVT Prophylaxis [x] Lovenox, []  Heparin, [] SCDs, [] Ambulation   GI Prophylaxis [] PPI,  [x] H2 Blocker,  [] Carafate,  [] Diet/Tube Feeds   Code Status Full Code   Disposition Patient requires continued admission due to SOB   MDM [] Low, [x] Moderate,[]  High     Electronically signed by Paige Perez MD on 9/15/2020 at 5:18 PM

## 2020-09-15 NOTE — PROGRESS NOTES
Pt able to walk around room and stand up on room air. Oxygen dropped to lowest of 89%. Pt does not feel short of breath and tolerated walking fine.

## 2020-09-16 VITALS
TEMPERATURE: 98.4 F | DIASTOLIC BLOOD PRESSURE: 88 MMHG | WEIGHT: 293 LBS | RESPIRATION RATE: 18 BRPM | HEIGHT: 64 IN | OXYGEN SATURATION: 93 % | SYSTOLIC BLOOD PRESSURE: 143 MMHG | HEART RATE: 88 BPM | BODY MASS INDEX: 50.02 KG/M2

## 2020-09-16 LAB
ALBUMIN SERPL-MCNC: 4.3 GM/DL (ref 3.4–5)
ALP BLD-CCNC: 57 IU/L (ref 40–129)
ALT SERPL-CCNC: 28 U/L (ref 10–40)
ANION GAP SERPL CALCULATED.3IONS-SCNC: 11 MMOL/L (ref 4–16)
APTT: 29.6 SECONDS (ref 25.1–37.1)
AST SERPL-CCNC: 15 IU/L (ref 15–37)
BASOPHILS ABSOLUTE: 0 K/CU MM
BASOPHILS RELATIVE PERCENT: 0.2 % (ref 0–1)
BILIRUB SERPL-MCNC: 0.2 MG/DL (ref 0–1)
BUN BLDV-MCNC: 17 MG/DL (ref 6–23)
CALCIUM SERPL-MCNC: 9.5 MG/DL (ref 8.3–10.6)
CHLORIDE BLD-SCNC: 100 MMOL/L (ref 99–110)
CO2: 25 MMOL/L (ref 21–32)
CREAT SERPL-MCNC: 0.8 MG/DL (ref 0.6–1.1)
DIFFERENTIAL TYPE: ABNORMAL
EOSINOPHILS ABSOLUTE: 0 K/CU MM
EOSINOPHILS RELATIVE PERCENT: 0.1 % (ref 0–3)
FIBRINOGEN LEVEL: 207 MG/DL (ref 196.9–442.1)
GFR AFRICAN AMERICAN: >60 ML/MIN/1.73M2
GFR NON-AFRICAN AMERICAN: >60 ML/MIN/1.73M2
GLUCOSE BLD-MCNC: 91 MG/DL (ref 70–99)
HCT VFR BLD CALC: 48.8 % (ref 37–47)
HEMOGLOBIN: 15.9 GM/DL (ref 12.5–16)
HIGH SENSITIVE C-REACTIVE PROTEIN: 0.7 MG/L
IMMATURE NEUTROPHIL %: 0.5 % (ref 0–0.43)
INR BLD: 0.93 INDEX
LACTATE DEHYDROGENASE: 177 IU/L (ref 120–246)
LYMPHOCYTES ABSOLUTE: 2.8 K/CU MM
LYMPHOCYTES RELATIVE PERCENT: 23.4 % (ref 24–44)
MCH RBC QN AUTO: 31.2 PG (ref 27–31)
MCHC RBC AUTO-ENTMCNC: 32.6 % (ref 32–36)
MCV RBC AUTO: 95.9 FL (ref 78–100)
MONOCYTES ABSOLUTE: 0.9 K/CU MM
MONOCYTES RELATIVE PERCENT: 7.2 % (ref 0–4)
NUCLEATED RBC %: 0 %
PDW BLD-RTO: 13.9 % (ref 11.7–14.9)
PLATELET # BLD: 337 K/CU MM (ref 140–440)
PMV BLD AUTO: 10.3 FL (ref 7.5–11.1)
POTASSIUM SERPL-SCNC: 4.5 MMOL/L (ref 3.5–5.1)
PROCALCITONIN: 0.06
PROTHROMBIN TIME: 11.3 SECONDS (ref 11.7–14.5)
RBC # BLD: 5.09 M/CU MM (ref 4.2–5.4)
SEGMENTED NEUTROPHILS ABSOLUTE COUNT: 8.1 K/CU MM
SEGMENTED NEUTROPHILS RELATIVE PERCENT: 68.6 % (ref 36–66)
SODIUM BLD-SCNC: 136 MMOL/L (ref 135–145)
TOTAL IMMATURE NEUTOROPHIL: 0.06 K/CU MM
TOTAL NUCLEATED RBC: 0 K/CU MM
TOTAL PROTEIN: 6.6 GM/DL (ref 6.4–8.2)
WBC # BLD: 11.8 K/CU MM (ref 4–10.5)

## 2020-09-16 PROCEDURE — 94640 AIRWAY INHALATION TREATMENT: CPT

## 2020-09-16 PROCEDURE — 85730 THROMBOPLASTIN TIME PARTIAL: CPT

## 2020-09-16 PROCEDURE — 85025 COMPLETE CBC W/AUTO DIFF WBC: CPT

## 2020-09-16 PROCEDURE — 2580000003 HC RX 258: Performed by: INTERNAL MEDICINE

## 2020-09-16 PROCEDURE — G0378 HOSPITAL OBSERVATION PER HR: HCPCS

## 2020-09-16 PROCEDURE — 85610 PROTHROMBIN TIME: CPT

## 2020-09-16 PROCEDURE — 86141 C-REACTIVE PROTEIN HS: CPT

## 2020-09-16 PROCEDURE — 84145 PROCALCITONIN (PCT): CPT

## 2020-09-16 PROCEDURE — 94761 N-INVAS EAR/PLS OXIMETRY MLT: CPT

## 2020-09-16 PROCEDURE — 96376 TX/PRO/DX INJ SAME DRUG ADON: CPT

## 2020-09-16 PROCEDURE — 6370000000 HC RX 637 (ALT 250 FOR IP): Performed by: INTERNAL MEDICINE

## 2020-09-16 PROCEDURE — 85384 FIBRINOGEN ACTIVITY: CPT

## 2020-09-16 PROCEDURE — 83615 LACTATE (LD) (LDH) ENZYME: CPT

## 2020-09-16 PROCEDURE — 80053 COMPREHEN METABOLIC PANEL: CPT

## 2020-09-16 PROCEDURE — 6360000002 HC RX W HCPCS: Performed by: INTERNAL MEDICINE

## 2020-09-16 RX ORDER — ALBUTEROL SULFATE 90 UG/1
2 AEROSOL, METERED RESPIRATORY (INHALATION) EVERY 6 HOURS PRN
Qty: 2 INHALER | Refills: 3 | Status: SHIPPED | OUTPATIENT
Start: 2020-09-16

## 2020-09-16 RX ORDER — NICOTINE 21 MG/24HR
1 PATCH, TRANSDERMAL 24 HOURS TRANSDERMAL DAILY
Qty: 30 PATCH | Refills: 3 | Status: SHIPPED | OUTPATIENT
Start: 2020-09-16 | End: 2021-05-26

## 2020-09-16 RX ADMIN — ASPIRIN 81 MG CHEWABLE TABLET 81 MG: 81 TABLET CHEWABLE at 08:23

## 2020-09-16 RX ADMIN — SODIUM CHLORIDE, PRESERVATIVE FREE 10 ML: 5 INJECTION INTRAVENOUS at 08:24

## 2020-09-16 RX ADMIN — METHYLPREDNISOLONE SODIUM SUCCINATE 40 MG: 40 INJECTION, POWDER, FOR SOLUTION INTRAMUSCULAR; INTRAVENOUS at 05:11

## 2020-09-16 RX ADMIN — Medication 2 PUFF: at 07:40

## 2020-09-16 RX ADMIN — FAMOTIDINE 20 MG: 20 TABLET, FILM COATED ORAL at 08:23

## 2020-09-16 RX ADMIN — ALBUTEROL SULFATE 2 PUFF: 108 AEROSOL, METERED RESPIRATORY (INHALATION) at 07:40

## 2020-09-16 RX ADMIN — ENOXAPARIN SODIUM 30 MG: 30 INJECTION SUBCUTANEOUS at 08:24

## 2020-09-16 ASSESSMENT — PAIN SCALES - GENERAL
PAINLEVEL_OUTOF10: 0
PAINLEVEL_OUTOF10: 0

## 2020-09-16 NOTE — PLAN OF CARE
Problem: Anxiety:  Goal: Level of anxiety will decrease  Description: Level of anxiety will decrease  9/16/2020 0937 by Tato Parra RN  Outcome: Completed  9/16/2020 0013 by Mary Burgess RN  Outcome: Ongoing     Problem: Breathing Pattern - Ineffective:  Goal: Ability to achieve and maintain a regular respiratory rate will improve  Description: Ability to achieve and maintain a regular respiratory rate will improve  9/16/2020 0937 by Tato Parra RN  Outcome: Completed  9/16/2020 0013 by Mary Burgess RN  Outcome: Ongoing     Problem:  Activity:  Goal: Fatigue will decrease  Description: Fatigue will decrease  9/16/2020 0937 by Tato Parra RN  Outcome: Completed  9/16/2020 0013 by Mary Burgess RN  Outcome: Ongoing     Problem: Cardiac:  Goal: Hemodynamic stability will improve  Description: Hemodynamic stability will improve  9/16/2020 0937 by Tato Parra RN  Outcome: Completed  9/16/2020 0013 by Mary Burgess RN  Outcome: Ongoing     Problem: Coping:  Goal: Level of anxiety will decrease  Description: Level of anxiety will decrease  9/16/2020 0937 by Tato Parra RN  Outcome: Completed  9/16/2020 0013 by Mary Burgess RN  Outcome: Ongoing  Goal: Ability to cope will improve  Description: Ability to cope will improve  9/16/2020 0937 by Tato Parra RN  Outcome: Completed  9/16/2020 0013 by Mary Burgess RN  Outcome: Ongoing  Goal: Ability to establish a method of communication will improve  Description: Ability to establish a method of communication will improve  9/16/2020 0937 by Tato Parra RN  Outcome: Completed  9/16/2020 0013 by Mary Burgess RN  Outcome: Ongoing     Problem: Nutritional:  Goal: Consumption of the prescribed amount of daily calories will improve  Description: Consumption of the prescribed amount of daily calories will improve  9/16/2020 0937 by Tato Parra RN  Outcome: Completed  9/16/2020 0013 by Mary Burgess

## 2020-09-16 NOTE — DISCHARGE SUMMARY
Discharge Summary Note  Patient ID:  Roseanna Byrnes  [id number]  14 y.o.  1985    Admit date: 9/13/2020    Discharge date and time: 9/16/2020 10:24 AM     Admitting Physician: Carla Rowell MD     Discharge Physician: Jacqueline Easton MD    Admission Diagnoses:   Bronchitis [J40]  Hypoxia [R09.02]  Suspected COVID-19 virus infection [Z20.828]    Discharge Diagnoses and Hospital Course:      Hypoxemic respiratory failure  COPD/asthma exacerbation  Currently oxygen requirements improved  Chest x-ray with no acute pathology  Respiratory PCR positive for rhinovirus  Pulmonary following appreciate recommendations, okay with discharge  COVID-19 test negative  Continue prednisone with taper and azithromycin  Did ambulatory pulse ox, with no dropping of oxygen below 89%     Elevated Troponin  Likely Type 2 elevation with hypoxic stress     Tobacco abuse  Advised to quit  Ordered Nicotine patch and gum    Admission Condition: fair    Discharged Condition: stable    Consults: Pulmonary    Significant Diagnostic Studies:   CBC with Differential:    Lab Results   Component Value Date    WBC 11.8 09/16/2020    RBC 5.09 09/16/2020    HGB 15.9 09/16/2020    HCT 48.8 09/16/2020     09/16/2020    MCV 95.9 09/16/2020    MCH 31.2 09/16/2020    MCHC 32.6 09/16/2020    RDW 13.9 09/16/2020    SEGSPCT 68.6 09/16/2020    LYMPHOPCT 23.4 09/16/2020    MONOPCT 7.2 09/16/2020    BASOPCT 0.2 09/16/2020    MONOSABS 0.9 09/16/2020    LYMPHSABS 2.8 09/16/2020    EOSABS 0.0 09/16/2020    BASOSABS 0.0 09/16/2020    DIFFTYPE AUTOMATED DIFFERENTIAL 09/16/2020     CMP:    Lab Results   Component Value Date     09/16/2020    K 4.5 09/16/2020     09/16/2020    CO2 25 09/16/2020    BUN 17 09/16/2020    CREATININE 0.8 09/16/2020    GFRAA >60 09/16/2020    AGRATIO 1.7 01/23/2019    LABGLOM >60 09/16/2020    GLUCOSE 91 09/16/2020    PROT 6.6 09/16/2020    LABALBU 4.3 09/16/2020    CALCIUM 9.5 09/16/2020    BILITOT 0.2 09/16/2020    ALKPHOS 57 09/16/2020    AST 15 09/16/2020    ALT 28 09/16/2020     Xr Chest Portable    Result Date: 9/13/2020  EXAMINATION: ONE XRAY VIEW OF THE CHEST 9/13/2020 3:45 pm COMPARISON: Chest radiograph 01/17/2020. HISTORY: ORDERING SYSTEM PROVIDED HISTORY: shortness of breath. cough TECHNOLOGIST PROVIDED HISTORY: Reason for exam:->shortness of breath. cough Reason for Exam: shortness of breath. cough Acuity: Acute Type of Exam: Initial Additional signs and symptoms: none Relevant Medical/Surgical History: asthma FINDINGS: The lungs are without acute focal process. There is no effusion or pneumothorax. The cardiomediastinal silhouette is without acute process. The osseous structures are without acute process. No acute process. Discharge Exam:  General Appearance: alert and oriented to person, place and time, in no acute distress  Cardiovascular: normal rate, regular rhythm, normal S1 and S2  Pulmonary/Chest: Decreased breath sounds bilaterally with occasional wheezing  Abdomen: soft, non-tender, non-distended, normal bowel sounds, no masses   Extremities: no cyanosis, clubbing or edema, pulse   Skin: warm and dry, no rash or erythema  Head: normocephalic and atraumatic  Eyes: pupils equal, round, and reactive to light  Neck: supple and non-tender without mass, no thyromegaly   Musculoskeletal: normal range of motion, no joint swelling, deformity or tenderness  Neurological: alert, oriented, normal speech, no focal findings or movement disorder noted       Disposition: home    Patient Instructions:     Discharge Medications:   Kiara Jimenez, [address] [place] [address] Medication Instructions UPX:[id number]    Printed on:09/16/20 1158   Medication Information                      albuterol sulfate  (90 Base) MCG/ACT inhaler  Inhale 2 puffs into the lungs every 6 hours as needed for Wheezing or Shortness of Breath (or cough) Please include spacer with instructions for use.              aspirin 81 MG chewable tablet  Take 1 tablet by mouth daily             azithromycin (ZITHROMAX) 500 MG tablet  Take 1 tablet by mouth daily for 3 days             nicotine (NICODERM CQ) 14 MG/24HR  Place 1 patch onto the skin daily             nicotine polacrilex (NICORETTE) 2 MG gum  Take 1 each by mouth as needed for Smoking cessation Maximum dose: 24 pieces/24 hours.              predniSONE (DELTASONE) 10 MG tablet  Take 1 tablet by mouth daily for 14 days 5tabs/ day x 3 days>4tabs/day x 3 days>3tabs/day x 3 days>2tabs/day x 3 days>1tab/day x 2 days                 Activity: activity as tolerated    Diet: regular diet    Wound Care: none needed    Follow-up:  PCP 1-2 weeks   Pulmonology in 1-2 weeks    Time Spent Doing discharge 35 min  Electronically signed by Bijan Dunne MD  on 9/16/20 at 11:58 AM EDT

## 2020-09-16 NOTE — PLAN OF CARE
Problem: Anxiety:  Goal: Level of anxiety will decrease  Description: Level of anxiety will decrease  9/16/2020 0013 by Juliane Romero RN  Outcome: Ongoing  9/15/2020 1128 by Bailee Martinez RN  Outcome: Ongoing     Problem: Breathing Pattern - Ineffective:  Goal: Ability to achieve and maintain a regular respiratory rate will improve  Description: Ability to achieve and maintain a regular respiratory rate will improve  9/16/2020 0013 by Juliane Romero RN  Outcome: Ongoing  9/15/2020 1128 by Bailee Martinez RN  Outcome: Ongoing     Problem:  Activity:  Goal: Fatigue will decrease  Description: Fatigue will decrease  9/16/2020 0013 by Juliane Romero RN  Outcome: Ongoing  9/15/2020 1128 by Bailee Martinez RN  Outcome: Ongoing     Problem: Cardiac:  Goal: Hemodynamic stability will improve  Description: Hemodynamic stability will improve  9/16/2020 0013 by Juliane Romero RN  Outcome: Ongoing  9/15/2020 1128 by Bailee Martinez RN  Outcome: Ongoing     Problem: Coping:  Goal: Level of anxiety will decrease  Description: Level of anxiety will decrease  9/16/2020 0013 by Juliane Romero RN  Outcome: Ongoing  9/15/2020 1128 by Bailee Martinez RN  Outcome: Ongoing  Goal: Ability to cope will improve  Description: Ability to cope will improve  9/16/2020 0013 by Juliane Romero RN  Outcome: Ongoing  9/15/2020 1128 by Bailee Martinez RN  Outcome: Ongoing  Goal: Ability to establish a method of communication will improve  Description: Ability to establish a method of communication will improve  9/16/2020 0013 by Juliane Romero RN  Outcome: Ongoing  9/15/2020 1128 by Bailee Martinez RN  Outcome: Ongoing     Problem: Nutritional:  Goal: Consumption of the prescribed amount of daily calories will improve  Description: Consumption of the prescribed amount of daily calories will improve  9/16/2020 0013 by Juliane Romero RN  Outcome: Ongoing  9/15/2020 1128 by Bailee Martinez RN  Outcome: Ongoing     Problem: Respiratory:  Goal:

## 2020-09-18 LAB
EKG ATRIAL RATE: 102 BPM
EKG DIAGNOSIS: NORMAL
EKG P AXIS: 58 DEGREES
EKG P-R INTERVAL: 174 MS
EKG Q-T INTERVAL: 352 MS
EKG QRS DURATION: 76 MS
EKG QTC CALCULATION (BAZETT): 458 MS
EKG R AXIS: 71 DEGREES
EKG T AXIS: 31 DEGREES
EKG VENTRICULAR RATE: 102 BPM

## 2020-09-28 ENCOUNTER — HOSPITAL ENCOUNTER (OUTPATIENT)
Dept: ULTRASOUND IMAGING | Age: 35
Discharge: HOME | End: 2020-09-28
Payer: MEDICAID

## 2020-09-28 ENCOUNTER — HOSPITAL ENCOUNTER (OUTPATIENT)
Age: 35
Discharge: HOME | End: 2020-09-28
Payer: MEDICAID

## 2020-09-28 PROCEDURE — 93970 EXTREMITY STUDY: CPT

## 2020-11-18 ENCOUNTER — HOSPITAL ENCOUNTER (OUTPATIENT)
Age: 35
Discharge: HOME | End: 2020-11-18
Payer: MEDICAID

## 2020-11-18 PROCEDURE — U0002 COVID-19 LAB TEST NON-CDC: HCPCS

## 2020-11-18 PROCEDURE — C9803 HOPD COVID-19 SPEC COLLECT: HCPCS

## 2020-11-20 LAB
SARS-COV-2: NOT DETECTED
SOURCE: NORMAL

## 2020-11-23 ENCOUNTER — HOSPITAL ENCOUNTER (OUTPATIENT)
Dept: PULMONOLOGY | Age: 35
Discharge: HOME | End: 2020-11-23
Payer: MEDICAID

## 2020-11-23 PROBLEM — J45.40 MODERATE PERSISTENT ASTHMA WITHOUT COMPLICATION: Status: ACTIVE | Noted: 2020-11-23

## 2020-11-23 LAB
DLCO %PRED: 109 %
DLCO PRED: NORMAL
DLCO/VA %PRED: NORMAL
DLCO/VA PRED: NORMAL
DLCO/VA: NORMAL
DLCO: NORMAL
EXPIRATORY TIME-POST: NORMAL
EXPIRATORY TIME: NORMAL
FEF 25-75% %CHNG: NORMAL
FEF 25-75% %PRED-POST: NORMAL
FEF 25-75% %PRED-PRE: NORMAL
FEF 25-75% PRED: NORMAL
FEF 25-75%-POST: NORMAL
FEF 25-75%-PRE: NORMAL
FEV1 %PRED-POST: 64 %
FEV1 %PRED-PRE: 60 %
FEV1 PRED: NORMAL
FEV1-POST: NORMAL
FEV1-PRE: NORMAL
FEV1/FVC %PRED-POST: NORMAL
FEV1/FVC %PRED-PRE: NORMAL
FEV1/FVC PRED: NORMAL
FEV1/FVC-POST: 70 %
FEV1/FVC-PRE: 66 %
FVC %PRED-POST: NORMAL
FVC %PRED-PRE: NORMAL
FVC PRED: NORMAL
FVC-POST: NORMAL
FVC-PRE: NORMAL
GAW %PRED: NORMAL
GAW PRED: NORMAL
GAW: NORMAL
IC %PRED: NORMAL
IC PRED: NORMAL
IC: NORMAL
MEP: NORMAL
MIP: NORMAL
MVV %PRED-PRE: NORMAL
MVV PRED: NORMAL
MVV-PRE: NORMAL
PEF %PRED-POST: NORMAL
PEF %PRED-PRE: NORMAL
PEF PRED: NORMAL
PEF%CHNG: NORMAL
PEF-POST: NORMAL
PEF-PRE: NORMAL
RAW %PRED: NORMAL
RAW PRED: NORMAL
RAW: NORMAL
RV %PRED: NORMAL
RV PRED: NORMAL
RV: NORMAL
SVC %PRED: NORMAL
SVC PRED: NORMAL
SVC: NORMAL
TLC %PRED: 116 %
TLC PRED: NORMAL
TLC: NORMAL
VA %PRED: NORMAL
VA PRED: NORMAL
VA: NORMAL
VTG %PRED: NORMAL
VTG PRED: NORMAL
VTG: NORMAL

## 2020-11-23 PROCEDURE — 94060 EVALUATION OF WHEEZING: CPT

## 2020-11-23 PROCEDURE — 94729 DIFFUSING CAPACITY: CPT

## 2020-11-23 PROCEDURE — 94726 PLETHYSMOGRAPHY LUNG VOLUMES: CPT

## 2020-11-23 ASSESSMENT — PULMONARY FUNCTION TESTS
FEV1/FVC_PRE: 66
FEV1_PERCENT_PREDICTED_POST: 64
FEV1/FVC_POST: 70
FEV1_PERCENT_PREDICTED_PRE: 60

## 2021-05-24 ENCOUNTER — APPOINTMENT (OUTPATIENT)
Dept: GENERAL RADIOLOGY | Age: 36
End: 2021-05-24
Payer: MEDICAID

## 2021-05-24 ENCOUNTER — HOSPITAL ENCOUNTER (EMERGENCY)
Age: 36
Discharge: HOME | End: 2021-05-24
Payer: MEDICAID

## 2021-05-24 VITALS
SYSTOLIC BLOOD PRESSURE: 99 MMHG | HEART RATE: 85 BPM | OXYGEN SATURATION: 95 % | DIASTOLIC BLOOD PRESSURE: 66 MMHG | BODY MASS INDEX: 50.02 KG/M2 | RESPIRATION RATE: 17 BRPM | HEIGHT: 64 IN | WEIGHT: 293 LBS | TEMPERATURE: 97.8 F

## 2021-05-24 DIAGNOSIS — S52.501A CLOSED FRACTURE OF DISTAL END OF RIGHT RADIUS, UNSPECIFIED FRACTURE MORPHOLOGY, INITIAL ENCOUNTER: Primary | ICD-10-CM

## 2021-05-24 PROCEDURE — 6370000000 HC RX 637 (ALT 250 FOR IP): Performed by: PHYSICIAN ASSISTANT

## 2021-05-24 PROCEDURE — 73090 X-RAY EXAM OF FOREARM: CPT

## 2021-05-24 PROCEDURE — 73110 X-RAY EXAM OF WRIST: CPT

## 2021-05-24 PROCEDURE — 29125 APPL SHORT ARM SPLINT STATIC: CPT

## 2021-05-24 PROCEDURE — 96372 THER/PROPH/DIAG INJ SC/IM: CPT

## 2021-05-24 PROCEDURE — 73130 X-RAY EXAM OF HAND: CPT

## 2021-05-24 PROCEDURE — 6360000002 HC RX W HCPCS: Performed by: PHYSICIAN ASSISTANT

## 2021-05-24 PROCEDURE — 99283 EMERGENCY DEPT VISIT LOW MDM: CPT

## 2021-05-24 RX ORDER — HYDROCODONE BITARTRATE AND ACETAMINOPHEN 5; 325 MG/1; MG/1
1 TABLET ORAL EVERY 6 HOURS PRN
Qty: 10 TABLET | Refills: 0 | Status: SHIPPED | OUTPATIENT
Start: 2021-05-24 | End: 2021-05-27

## 2021-05-24 RX ORDER — IBUPROFEN 800 MG/1
800 TABLET ORAL EVERY 6 HOURS PRN
Qty: 30 TABLET | Refills: 0 | Status: ON HOLD | OUTPATIENT
Start: 2021-05-24 | End: 2021-05-27 | Stop reason: SDUPTHER

## 2021-05-24 RX ORDER — HYDROCODONE BITARTRATE AND ACETAMINOPHEN 5; 325 MG/1; MG/1
1 TABLET ORAL ONCE
Status: COMPLETED | OUTPATIENT
Start: 2021-05-24 | End: 2021-05-24

## 2021-05-24 RX ORDER — KETOROLAC TROMETHAMINE 30 MG/ML
30 INJECTION, SOLUTION INTRAMUSCULAR; INTRAVENOUS ONCE
Status: COMPLETED | OUTPATIENT
Start: 2021-05-24 | End: 2021-05-24

## 2021-05-24 RX ADMIN — HYDROCODONE BITARTRATE AND ACETAMINOPHEN 1 TABLET: 5; 325 TABLET ORAL at 16:31

## 2021-05-24 RX ADMIN — KETOROLAC TROMETHAMINE 30 MG: 30 INJECTION, SOLUTION INTRAMUSCULAR; INTRAVENOUS at 16:31

## 2021-05-24 ASSESSMENT — PAIN SCALES - GENERAL: PAINLEVEL_OUTOF10: 8
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
## 2021-05-25 ENCOUNTER — OFFICE VISIT (OUTPATIENT)
Dept: ORTHOPEDIC SURGERY | Age: 36
End: 2021-05-25
Payer: MEDICAID

## 2021-05-25 ENCOUNTER — ANESTHESIA EVENT (OUTPATIENT)
Dept: OPERATING ROOM | Age: 36
End: 2021-05-25
Payer: MEDICAID

## 2021-05-25 VITALS — WEIGHT: 293 LBS | BODY MASS INDEX: 50.02 KG/M2 | RESPIRATION RATE: 14 BRPM | HEIGHT: 64 IN

## 2021-05-25 DIAGNOSIS — Z01.818 PRE-OP EVALUATION: Primary | ICD-10-CM

## 2021-05-25 DIAGNOSIS — S52.501A CLOSED FRACTURE OF DISTAL ENDS OF RIGHT RADIUS AND ULNA, INITIAL ENCOUNTER: ICD-10-CM

## 2021-05-25 DIAGNOSIS — S52.601A CLOSED FRACTURE OF DISTAL ENDS OF RIGHT RADIUS AND ULNA, INITIAL ENCOUNTER: ICD-10-CM

## 2021-05-25 PROCEDURE — 4004F PT TOBACCO SCREEN RCVD TLK: CPT | Performed by: ORTHOPAEDIC SURGERY

## 2021-05-25 PROCEDURE — G8417 CALC BMI ABV UP PARAM F/U: HCPCS | Performed by: ORTHOPAEDIC SURGERY

## 2021-05-25 PROCEDURE — G8427 DOCREV CUR MEDS BY ELIG CLIN: HCPCS | Performed by: ORTHOPAEDIC SURGERY

## 2021-05-25 PROCEDURE — 99203 OFFICE O/P NEW LOW 30 MIN: CPT | Performed by: ORTHOPAEDIC SURGERY

## 2021-05-25 RX ORDER — HYDROCODONE BITARTRATE AND ACETAMINOPHEN 5; 325 MG/1; MG/1
1 TABLET ORAL EVERY 6 HOURS PRN
Qty: 28 TABLET | Refills: 0 | Status: SHIPPED | OUTPATIENT
Start: 2021-05-25 | End: 2021-06-01

## 2021-05-25 ASSESSMENT — LIFESTYLE VARIABLES: SMOKING_STATUS: 1
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
## 2021-05-25 NOTE — ANESTHESIA PRE PROCEDURE
Department of Anesthesiology  Preprocedure Note       Name:  M[place]   Age:  39 y.o.  :  1985                                          MRN:  [id number]         Date:  2021      Surgeon: Daisy Durant):  Swathi Fong MD    Procedure: Procedure(s):  RIGHT WRIST OPEN REDUCTION INTERNAL FIXATION    Medications prior to admission:   Prior to Admission medications    Medication Sig Start Date End Date Taking? Authorizing Provider   HYDROcodone-acetaminophen (NORCO) 5-325 MG per tablet Take 1 tablet by mouth every 6 hours as needed for Pain for up to 7 days. Intended supply: 7 days. Take lowest dose possible to manage pain 21  Swathi Fong MD   HYDROcodone-acetaminophen [facility][1]) 5-325 MG per tablet Take 1 tablet by mouth every 6 hours as needed for Pain for up to 3 days. 21  MARKELL Vines   ibuprofen (ADVIL;MOTRIN) 800 MG tablet Take 1 tablet by mouth every 6 hours as needed for Pain 21   MARKELL Vines   sertraline (ZOLOFT) 25 MG tablet Take 25 mg by mouth daily 10/23/20   Historical Provider, MD   albuterol sulfate HFA (PROAIR HFA) 108 (90 Base) MCG/ACT inhaler Inhale 2 puffs into the lungs every 6 hours as needed for Wheezing 20   Ander Bonilla MD   fluticasone [facility]) 110 MCG/ACT inhaler Inhale 2 puffs into the lungs 2 times daily 20   Ander Bonilla MD   albuterol sulfate  (90 Base) MCG/ACT inhaler Inhale 2 puffs into the lungs every 6 hours as needed for Wheezing or Shortness of Breath (or cough) Please include spacer with instructions for use. 20   Chester Blakely MD   nicotine (NICODERM CQ) 14 MG/24HR Place 1 patch onto the skin daily 20   Chester Blakely MD   nicotine polacrilex (NICORETTE) 2 MG gum Take 1 each by mouth as needed for Smoking cessation Maximum dose: 24 pieces/24 hours.  20   Chester Blakely MD   aspirin 81 MG chewable tablet Take 1 tablet by mouth daily 20   Carlos Cali Juliana Conn MD       Current medications:    No current facility-administered medications for this encounter. Current Outpatient Medications   Medication Sig Dispense Refill    HYDROcodone-acetaminophen (NORCO) 5-325 MG per tablet Take 1 tablet by mouth every 6 hours as needed for Pain for up to 7 days. Intended supply: 7 days. Take lowest dose possible to manage pain 28 tablet 0    HYDROcodone-acetaminophen (NORCO) 5-325 MG per tablet Take 1 tablet by mouth every 6 hours as needed for Pain for up to 3 days. 10 tablet 0    ibuprofen (ADVIL;MOTRIN) 800 MG tablet Take 1 tablet by mouth every 6 hours as needed for Pain 30 tablet 0    sertraline (ZOLOFT) 25 MG tablet Take 25 mg by mouth daily      albuterol sulfate HFA (PROAIR HFA) 108 (90 Base) MCG/ACT inhaler Inhale 2 puffs into the lungs every 6 hours as needed for Wheezing 1 Inhaler 5    fluticasone (FLOVENT HFA) 110 MCG/ACT inhaler Inhale 2 puffs into the lungs 2 times daily 1 Inhaler 5    albuterol sulfate  (90 Base) MCG/ACT inhaler Inhale 2 puffs into the lungs every 6 hours as needed for Wheezing or Shortness of Breath (or cough) Please include spacer with instructions for use. 2 Inhaler 3    nicotine (NICODERM CQ) 14 MG/24HR Place 1 patch onto the skin daily 30 patch 3    nicotine polacrilex (NICORETTE) 2 MG gum Take 1 each by mouth as needed for Smoking cessation Maximum dose: 24 pieces/24 hours.  110 each 3    aspirin 81 MG chewable tablet Take 1 tablet by mouth daily 30 tablet 3       Allergies:  No Known Allergies    Problem List:    Patient Active Problem List   Diagnosis Code    Family history of colon cancer Z80.0    Current episode of major depressive disorder without prior episode F32.9    Nonintractable headache R51.9    Family history of early CAD Z80.55    Gastrointestinal hemorrhage associated with anorectal source K62.5    Suspected COVID-19 virus infection Z20.822    Moderate persistent asthma without complication 09/16/2020    ALT 28 09/16/2020       POC Tests: No results for input(s): POCGLU, POCNA, POCK, POCCL, POCBUN, POCHEMO, POCHCT in the last 72 hours. Coags:   Lab Results   Component Value Date    PROTIME 11.3 09/16/2020    INR 0.93 09/16/2020    APTT 29.6 09/16/2020       HCG (If Applicable):   Lab Results   Component Value Date    PREGTESTUR NEGATIVE 03/19/2019    PREGTESTUR (NOTE)  PERFORMED BY POINT OF CARE METHOD.   03/19/2019        ABGs: No results found for: PHART, PO2ART, WGQ2CJO, AJV3ZEK, BEART, E5ZGLHVY     Type & Screen (If Applicable):  No results found for: LABABO, LABRH    Drug/Infectious Status (If Applicable):  No results found for: HIV, HEPCAB    COVID-19 Screening (If Applicable):   Lab Results   Component Value Date    COVID19 NOT DETECTED 11/18/2020           Anesthesia Evaluation  Patient summary reviewed  Airway: Mallampati: II  TM distance: >3 FB   Neck ROM: full  Mouth opening: > = 3 FB Dental:          Pulmonary:normal exam    (+) asthma: current smoker                           Cardiovascular:Negative CV ROS             Beta Blocker:  Not on Beta Blocker         Neuro/Psych:   (+) headaches: migraine headaches, psychiatric history:            GI/Hepatic/Renal:   (+) morbid obesity          Endo/Other: Negative Endo/Other ROS                    Abdominal:           Vascular: negative vascular ROS. Anesthesia Plan      general     ASA 3       Induction: intravenous. MIPS: Postoperative opioids intended. Anesthetic plan and risks discussed with patient. Plan discussed with CRNA. Attending anesthesiologist reviewed and agrees with Pre Eval content            SHELBY Coe - CRNA   5/25/2021     Pre Anesthesia Assessment complete.  Chart reviewed on 5/25/2021

## 2021-05-26 NOTE — PROGRESS NOTES
.Surgery 5/27/21 @ 0930, arrival 0730               1. Do not eat or drink anything after midnight - unless instructed by your doctor prior to surgery. This includes                   no water, chewing gum or mints. 2. Follow your directions as prescribed by the doctor for your procedure and medications. 3. Check with your Doctor regarding stopping Plavix, Coumadin, Lovenox,Effient,Pradaxa,Xarelto, Fragmin or other blood thinners and                   follow their instructions. 4. Do not smoke, and do not drink any alcoholic beverages 24 hours prior to surgery. This includes NA Beer. 5. You may brush your teeth and gargle the morning of surgery. DO NOT SWALLOW WATER   6. You MUST make arrangements for a responsible adult to take you home after your surgery and be able to check on you every couple                   hours for the day. You will not be allowed to leave alone or drive yourself home. It is strongly suggested someone stay with you the first 24                   hrs. Your surgery will be cancelled if you do not have a ride home. 7. Please wear simple, loose fitting clothing to the hospital.  Sarina Liane not bring valuables (money, credit cards, checkbooks, etc.) Do not wear any                   makeup (including no eye makeup) or nail polish on your fingers or toes. 8. DO NOT wear any jewelry or piercings on day of surgery. All body piercing jewelry must be removed. 9. If you have dentures, they will be removed before going to the OR; we will provide you a container. If you wear contact lenses or glasses,                  they will be removed; please bring a case for them. 10. If you  have a Living Will and Durable Power of  for Healthcare, please bring in a copy. 11. Please bring picture ID,  insurance card, paperwork from the doctors office    (H & P, Consent, & card for implantable devices).            12. Take a shower the night before or morning of your procedure, do not apply any lotion, oil or powder. 13. Wear a mask covering your nose & mouth when entering the hospital. Vaccinated -Xiao Loera x2 - last dose 5/6/2021. Card under media tab.

## 2021-05-27 ENCOUNTER — ANESTHESIA (OUTPATIENT)
Dept: OPERATING ROOM | Age: 36
End: 2021-05-27
Payer: MEDICAID

## 2021-05-27 ENCOUNTER — APPOINTMENT (OUTPATIENT)
Dept: GENERAL RADIOLOGY | Age: 36
End: 2021-05-27
Attending: ORTHOPAEDIC SURGERY
Payer: MEDICAID

## 2021-05-27 ENCOUNTER — HOSPITAL ENCOUNTER (OUTPATIENT)
Age: 36
Setting detail: OUTPATIENT SURGERY
Discharge: HOME | End: 2021-05-27
Attending: ORTHOPAEDIC SURGERY | Admitting: ORTHOPAEDIC SURGERY
Payer: MEDICAID

## 2021-05-27 VITALS
DIASTOLIC BLOOD PRESSURE: 98 MMHG | RESPIRATION RATE: 14 BRPM | OXYGEN SATURATION: 93 % | SYSTOLIC BLOOD PRESSURE: 112 MMHG | TEMPERATURE: 98.4 F

## 2021-05-27 VITALS
HEIGHT: 64 IN | BODY MASS INDEX: 50.02 KG/M2 | SYSTOLIC BLOOD PRESSURE: 133 MMHG | RESPIRATION RATE: 16 BRPM | TEMPERATURE: 97 F | DIASTOLIC BLOOD PRESSURE: 86 MMHG | HEART RATE: 77 BPM | WEIGHT: 293 LBS | OXYGEN SATURATION: 92 %

## 2021-05-27 LAB
GLUCOSE BLD-MCNC: 128 MG/DL (ref 70–99)
PREGNANCY TEST URINE, POC: NEGATIVE

## 2021-05-27 PROCEDURE — 2500000003 HC RX 250 WO HCPCS: Performed by: NURSE ANESTHETIST, CERTIFIED REGISTERED

## 2021-05-27 PROCEDURE — 82962 GLUCOSE BLOOD TEST: CPT

## 2021-05-27 PROCEDURE — 3700000001 HC ADD 15 MINUTES (ANESTHESIA): Performed by: ORTHOPAEDIC SURGERY

## 2021-05-27 PROCEDURE — 2709999900 HC NON-CHARGEABLE SUPPLY: Performed by: ORTHOPAEDIC SURGERY

## 2021-05-27 PROCEDURE — 7100000000 HC PACU RECOVERY - FIRST 15 MIN: Performed by: ORTHOPAEDIC SURGERY

## 2021-05-27 PROCEDURE — 6360000002 HC RX W HCPCS: Performed by: NURSE ANESTHETIST, CERTIFIED REGISTERED

## 2021-05-27 PROCEDURE — 81025 URINE PREGNANCY TEST: CPT

## 2021-05-27 PROCEDURE — 3600000014 HC SURGERY LEVEL 4 ADDTL 15MIN: Performed by: ORTHOPAEDIC SURGERY

## 2021-05-27 PROCEDURE — 7100000010 HC PHASE II RECOVERY - FIRST 15 MIN: Performed by: ORTHOPAEDIC SURGERY

## 2021-05-27 PROCEDURE — 6360000002 HC RX W HCPCS: Performed by: ANESTHESIOLOGY

## 2021-05-27 PROCEDURE — 2580000003 HC RX 258: Performed by: ANESTHESIOLOGY

## 2021-05-27 PROCEDURE — 25607 OPTX DST RD XARTC FX/EPI SEP: CPT | Performed by: ORTHOPAEDIC SURGERY

## 2021-05-27 PROCEDURE — 7100000011 HC PHASE II RECOVERY - ADDTL 15 MIN: Performed by: ORTHOPAEDIC SURGERY

## 2021-05-27 PROCEDURE — 2720000010 HC SURG SUPPLY STERILE: Performed by: ORTHOPAEDIC SURGERY

## 2021-05-27 PROCEDURE — C1713 ANCHOR/SCREW BN/BN,TIS/BN: HCPCS | Performed by: ORTHOPAEDIC SURGERY

## 2021-05-27 PROCEDURE — 3700000000 HC ANESTHESIA ATTENDED CARE: Performed by: ORTHOPAEDIC SURGERY

## 2021-05-27 PROCEDURE — 6370000000 HC RX 637 (ALT 250 FOR IP)

## 2021-05-27 PROCEDURE — 6360000002 HC RX W HCPCS: Performed by: ORTHOPAEDIC SURGERY

## 2021-05-27 PROCEDURE — 7100000001 HC PACU RECOVERY - ADDTL 15 MIN: Performed by: ORTHOPAEDIC SURGERY

## 2021-05-27 PROCEDURE — 3600000004 HC SURGERY LEVEL 4 BASE: Performed by: ORTHOPAEDIC SURGERY

## 2021-05-27 PROCEDURE — 2500000003 HC RX 250 WO HCPCS: Performed by: ORTHOPAEDIC SURGERY

## 2021-05-27 PROCEDURE — 76000 FLUOROSCOPY <1 HR PHYS/QHP: CPT

## 2021-05-27 DEVICE — SCREW BNE L14MM DIA2.7MM CORT S STL ST T8 STARDRV RECESS: Type: IMPLANTABLE DEVICE | Site: WRIST | Status: FUNCTIONAL

## 2021-05-27 DEVICE — SCREW BNE L16MM DIA2.7MM CORT S STL ST T8 STARDRV RECESS: Type: IMPLANTABLE DEVICE | Site: WRIST | Status: FUNCTIONAL

## 2021-05-27 DEVICE — SCREW BNE L20MM DIA2.4MM DST RAD VOLAR S STL ST VAR ANG LOK: Type: IMPLANTABLE DEVICE | Site: WRIST | Status: FUNCTIONAL

## 2021-05-27 DEVICE — PLATE BNE W22XL54MM STD 9 H R DST RAD VOLAR S STL VAR ANG: Type: IMPLANTABLE DEVICE | Status: FUNCTIONAL

## 2021-05-27 DEVICE — SCREW BNE L22MM DIA2.4MM DST RAD VOLAR S STL ST VAR ANG LOK: Type: IMPLANTABLE DEVICE | Site: WRIST | Status: FUNCTIONAL

## 2021-05-27 DEVICE — SCREW BNE L18MM DIA2.4MM DST RAD VOLAR S STL ST VAR ANG LOK: Type: IMPLANTABLE DEVICE | Site: WRIST | Status: FUNCTIONAL

## 2021-05-27 RX ORDER — ROCURONIUM BROMIDE 10 MG/ML
INJECTION, SOLUTION INTRAVENOUS PRN
Status: DISCONTINUED | OUTPATIENT
Start: 2021-05-27 | End: 2021-05-27 | Stop reason: SDUPTHER

## 2021-05-27 RX ORDER — ONDANSETRON 2 MG/ML
4 INJECTION INTRAMUSCULAR; INTRAVENOUS
Status: DISCONTINUED | OUTPATIENT
Start: 2021-05-27 | End: 2021-05-27 | Stop reason: HOSPADM

## 2021-05-27 RX ORDER — IBUPROFEN 800 MG/1
800 TABLET ORAL EVERY 8 HOURS PRN
Qty: 30 TABLET | Refills: 0 | Status: SHIPPED | OUTPATIENT
Start: 2021-05-27

## 2021-05-27 RX ORDER — DIPHENHYDRAMINE HYDROCHLORIDE 50 MG/ML
12.5 INJECTION INTRAMUSCULAR; INTRAVENOUS
Status: DISCONTINUED | OUTPATIENT
Start: 2021-05-27 | End: 2021-05-27 | Stop reason: HOSPADM

## 2021-05-27 RX ORDER — LABETALOL HYDROCHLORIDE 5 MG/ML
5 INJECTION, SOLUTION INTRAVENOUS EVERY 10 MIN PRN
Status: DISCONTINUED | OUTPATIENT
Start: 2021-05-27 | End: 2021-05-27 | Stop reason: HOSPADM

## 2021-05-27 RX ORDER — KETOROLAC TROMETHAMINE 30 MG/ML
INJECTION, SOLUTION INTRAMUSCULAR; INTRAVENOUS PRN
Status: DISCONTINUED | OUTPATIENT
Start: 2021-05-27 | End: 2021-05-27 | Stop reason: SDUPTHER

## 2021-05-27 RX ORDER — IPRATROPIUM BROMIDE AND ALBUTEROL SULFATE 2.5; .5 MG/3ML; MG/3ML
1 SOLUTION RESPIRATORY (INHALATION) ONCE
Status: COMPLETED | OUTPATIENT
Start: 2021-05-27 | End: 2021-05-27

## 2021-05-27 RX ORDER — ONDANSETRON 2 MG/ML
INJECTION INTRAMUSCULAR; INTRAVENOUS PRN
Status: DISCONTINUED | OUTPATIENT
Start: 2021-05-27 | End: 2021-05-27 | Stop reason: SDUPTHER

## 2021-05-27 RX ORDER — PROPOFOL 10 MG/ML
INJECTION, EMULSION INTRAVENOUS PRN
Status: DISCONTINUED | OUTPATIENT
Start: 2021-05-27 | End: 2021-05-27 | Stop reason: SDUPTHER

## 2021-05-27 RX ORDER — DEXAMETHASONE SODIUM PHOSPHATE 4 MG/ML
INJECTION, SOLUTION INTRA-ARTICULAR; INTRALESIONAL; INTRAMUSCULAR; INTRAVENOUS; SOFT TISSUE PRN
Status: DISCONTINUED | OUTPATIENT
Start: 2021-05-27 | End: 2021-05-27 | Stop reason: SDUPTHER

## 2021-05-27 RX ORDER — HYDROCODONE BITARTRATE AND ACETAMINOPHEN 5; 325 MG/1; MG/1
1 TABLET ORAL PRN
Status: DISCONTINUED | OUTPATIENT
Start: 2021-05-27 | End: 2021-05-27 | Stop reason: HOSPADM

## 2021-05-27 RX ORDER — HYDROMORPHONE HCL 110MG/55ML
0.5 PATIENT CONTROLLED ANALGESIA SYRINGE INTRAVENOUS EVERY 5 MIN PRN
Status: DISCONTINUED | OUTPATIENT
Start: 2021-05-27 | End: 2021-05-27 | Stop reason: HOSPADM

## 2021-05-27 RX ORDER — HYDROCODONE BITARTRATE AND ACETAMINOPHEN 5; 325 MG/1; MG/1
2 TABLET ORAL EVERY 6 HOURS PRN
Status: DISCONTINUED | OUTPATIENT
Start: 2021-05-27 | End: 2021-05-27 | Stop reason: HOSPADM

## 2021-05-27 RX ORDER — LIDOCAINE HYDROCHLORIDE 20 MG/ML
INJECTION, SOLUTION INTRAVENOUS PRN
Status: DISCONTINUED | OUTPATIENT
Start: 2021-05-27 | End: 2021-05-27 | Stop reason: SDUPTHER

## 2021-05-27 RX ORDER — HYDRALAZINE HYDROCHLORIDE 20 MG/ML
5 INJECTION INTRAMUSCULAR; INTRAVENOUS EVERY 10 MIN PRN
Status: DISCONTINUED | OUTPATIENT
Start: 2021-05-27 | End: 2021-05-27 | Stop reason: HOSPADM

## 2021-05-27 RX ORDER — FENTANYL CITRATE 50 UG/ML
INJECTION, SOLUTION INTRAMUSCULAR; INTRAVENOUS PRN
Status: DISCONTINUED | OUTPATIENT
Start: 2021-05-27 | End: 2021-05-27 | Stop reason: SDUPTHER

## 2021-05-27 RX ORDER — MEPERIDINE HYDROCHLORIDE 25 MG/ML
12.5 INJECTION INTRAMUSCULAR; INTRAVENOUS; SUBCUTANEOUS EVERY 5 MIN PRN
Status: DISCONTINUED | OUTPATIENT
Start: 2021-05-27 | End: 2021-05-27 | Stop reason: HOSPADM

## 2021-05-27 RX ORDER — BUPIVACAINE HYDROCHLORIDE 5 MG/ML
INJECTION, SOLUTION EPIDURAL; INTRACAUDAL
Status: COMPLETED | OUTPATIENT
Start: 2021-05-27 | End: 2021-05-27

## 2021-05-27 RX ORDER — IPRATROPIUM BROMIDE AND ALBUTEROL SULFATE 2.5; .5 MG/3ML; MG/3ML
SOLUTION RESPIRATORY (INHALATION)
Status: COMPLETED
Start: 2021-05-27 | End: 2021-05-27

## 2021-05-27 RX ORDER — 0.9 % SODIUM CHLORIDE 0.9 %
500 INTRAVENOUS SOLUTION INTRAVENOUS
Status: DISCONTINUED | OUTPATIENT
Start: 2021-05-27 | End: 2021-05-27 | Stop reason: HOSPADM

## 2021-05-27 RX ORDER — FENTANYL CITRATE 50 UG/ML
50 INJECTION, SOLUTION INTRAMUSCULAR; INTRAVENOUS EVERY 5 MIN PRN
Status: DISCONTINUED | OUTPATIENT
Start: 2021-05-27 | End: 2021-05-27 | Stop reason: HOSPADM

## 2021-05-27 RX ORDER — SODIUM CHLORIDE, SODIUM LACTATE, POTASSIUM CHLORIDE, CALCIUM CHLORIDE 600; 310; 30; 20 MG/100ML; MG/100ML; MG/100ML; MG/100ML
INJECTION, SOLUTION INTRAVENOUS CONTINUOUS
Status: DISCONTINUED | OUTPATIENT
Start: 2021-05-27 | End: 2021-05-27 | Stop reason: HOSPADM

## 2021-05-27 RX ORDER — PROMETHAZINE HYDROCHLORIDE 25 MG/ML
6.25 INJECTION, SOLUTION INTRAMUSCULAR; INTRAVENOUS
Status: DISCONTINUED | OUTPATIENT
Start: 2021-05-27 | End: 2021-05-27 | Stop reason: HOSPADM

## 2021-05-27 RX ADMIN — KETOROLAC TROMETHAMINE 30 MG: 30 INJECTION, SOLUTION INTRAMUSCULAR; INTRAVENOUS at 11:29

## 2021-05-27 RX ADMIN — SUGAMMADEX 200 MG: 100 INJECTION, SOLUTION INTRAVENOUS at 11:41

## 2021-05-27 RX ADMIN — FENTANYL CITRATE 100 MCG: 50 INJECTION, SOLUTION INTRAMUSCULAR; INTRAVENOUS at 10:18

## 2021-05-27 RX ADMIN — LIDOCAINE HYDROCHLORIDE 100 MG: 20 INJECTION, SOLUTION INTRAVENOUS at 10:24

## 2021-05-27 RX ADMIN — ROCURONIUM BROMIDE 50 MG: 10 INJECTION INTRAVENOUS at 10:25

## 2021-05-27 RX ADMIN — IPRATROPIUM BROMIDE AND ALBUTEROL SULFATE 1 AMPULE: 2.5; .5 SOLUTION RESPIRATORY (INHALATION) at 12:32

## 2021-05-27 RX ADMIN — FENTANYL CITRATE 50 MCG: 50 INJECTION, SOLUTION INTRAMUSCULAR; INTRAVENOUS at 12:21

## 2021-05-27 RX ADMIN — CEFAZOLIN SODIUM 2 G: 10 INJECTION, POWDER, FOR SOLUTION INTRAVENOUS at 10:33

## 2021-05-27 RX ADMIN — PROPOFOL 200 MG: 10 INJECTION, EMULSION INTRAVENOUS at 10:24

## 2021-05-27 RX ADMIN — FENTANYL CITRATE 100 MCG: 50 INJECTION, SOLUTION INTRAMUSCULAR; INTRAVENOUS at 10:32

## 2021-05-27 RX ADMIN — CEFAZOLIN SODIUM 1 G: 10 INJECTION, POWDER, FOR SOLUTION INTRAVENOUS at 10:45

## 2021-05-27 RX ADMIN — IPRATROPIUM BROMIDE AND ALBUTEROL SULFATE 1 AMPULE: .5; 3 SOLUTION RESPIRATORY (INHALATION) at 12:32

## 2021-05-27 RX ADMIN — SODIUM CHLORIDE, POTASSIUM CHLORIDE, SODIUM LACTATE AND CALCIUM CHLORIDE: 600; 310; 30; 20 INJECTION, SOLUTION INTRAVENOUS at 08:20

## 2021-05-27 RX ADMIN — DEXAMETHASONE SODIUM PHOSPHATE 8 MG: 4 INJECTION, SOLUTION INTRAMUSCULAR; INTRAVENOUS at 10:36

## 2021-05-27 RX ADMIN — ONDANSETRON 4 MG: 2 INJECTION INTRAMUSCULAR; INTRAVENOUS at 10:44

## 2021-05-27 RX ADMIN — FENTANYL CITRATE 50 MCG: 50 INJECTION, SOLUTION INTRAMUSCULAR; INTRAVENOUS at 12:11

## 2021-05-27 ASSESSMENT — PAIN DESCRIPTION - FREQUENCY
FREQUENCY: CONTINUOUS

## 2021-05-27 ASSESSMENT — PAIN DESCRIPTION - PAIN TYPE
TYPE: SURGICAL PAIN

## 2021-05-27 ASSESSMENT — PAIN DESCRIPTION - DESCRIPTORS
DESCRIPTORS: THROBBING
DESCRIPTORS: SORE;SQUEEZING
DESCRIPTORS: THROBBING

## 2021-05-27 ASSESSMENT — PAIN DESCRIPTION - ORIENTATION
ORIENTATION: RIGHT

## 2021-05-27 ASSESSMENT — PULMONARY FUNCTION TESTS
PIF_VALUE: 35
PIF_VALUE: 32
PIF_VALUE: 31
PIF_VALUE: 27
PIF_VALUE: 31
PIF_VALUE: 27
PIF_VALUE: 31
PIF_VALUE: 31
PIF_VALUE: 27
PIF_VALUE: 31
PIF_VALUE: 0
PIF_VALUE: 31
PIF_VALUE: 1
PIF_VALUE: 31
PIF_VALUE: 27
PIF_VALUE: 31
PIF_VALUE: 30
PIF_VALUE: 27
PIF_VALUE: 31
PIF_VALUE: 31
PIF_VALUE: 27
PIF_VALUE: 31
PIF_VALUE: 31
PIF_VALUE: 1
PIF_VALUE: 30
PIF_VALUE: 25
PIF_VALUE: 31
PIF_VALUE: 30
PIF_VALUE: 28
PIF_VALUE: 32
PIF_VALUE: 31
PIF_VALUE: 28
PIF_VALUE: 36
PIF_VALUE: 29
PIF_VALUE: 1
PIF_VALUE: 7
PIF_VALUE: 32
PIF_VALUE: 31
PIF_VALUE: 41
PIF_VALUE: 31
PIF_VALUE: 31
PIF_VALUE: 38
PIF_VALUE: 29
PIF_VALUE: 3
PIF_VALUE: 28
PIF_VALUE: 30

## 2021-05-27 ASSESSMENT — PAIN SCALES - GENERAL
PAINLEVEL_OUTOF10: 8
PAINLEVEL_OUTOF10: 4
PAINLEVEL_OUTOF10: 5
PAINLEVEL_OUTOF10: 8

## 2021-05-27 ASSESSMENT — PAIN - FUNCTIONAL ASSESSMENT
PAIN_FUNCTIONAL_ASSESSMENT: ACTIVITIES ARE NOT PREVENTED
PAIN_FUNCTIONAL_ASSESSMENT: 0-10

## 2021-05-27 ASSESSMENT — PAIN DESCRIPTION - LOCATION
LOCATION: WRIST

## 2021-05-27 NOTE — PROGRESS NOTES
Patient discharge instructions and prescriptions reviewed and verified. RN reviewed d/c instructions with patient and family member, Corinne Guerrero. All questions answered. Prescription information given to patient. Discharge paperwork signed by RN and patient. Discharged to car  via wheelchair, home with family member Corinne Guerrero.
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
## 2021-05-27 NOTE — PROGRESS NOTES
1155: Arrived to PACU from OR. Monitors applied, alarms on. Report obtained from [facility] and DELMIS Rosario CRNA. Right arm elevated on pillow. 1211: Turned and repositioned in bed, shaggy care given and pad changed due to incontinence of urine. Medicated for right wrist discomfort. Wiggles fingers right hand and states had tingling sensation in that arm but had that same feeling before surgery. 1232: Duo neb started for end expiratory wheezing. Coughs and deep breathes with encouragement. 1235: Dozing. 1247: Transported to [facility].
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
## 2021-05-27 NOTE — PROGRESS NOTES
Patient returned to room from pacu, report at bedside from [facility] RNA+Ox4,VSS (see doc flow), assessment completed as per doc flow. Patient has 5/10 c/o pain. Beverage offered. Call light in reach, bed in low position. RN to continue to monitor. Patient to call family.

## 2021-06-15 ENCOUNTER — OFFICE VISIT (OUTPATIENT)
Dept: ORTHOPEDIC SURGERY | Age: 36
End: 2021-06-15

## 2021-06-15 VITALS — BODY MASS INDEX: 50.02 KG/M2 | WEIGHT: 293 LBS | HEIGHT: 64 IN

## 2021-06-15 DIAGNOSIS — S52.601D CLOSED FRACTURE OF DISTAL ENDS OF RIGHT RADIUS AND ULNA WITH ROUTINE HEALING, SUBSEQUENT ENCOUNTER: Primary | ICD-10-CM

## 2021-06-15 DIAGNOSIS — S52.501D CLOSED FRACTURE OF DISTAL ENDS OF RIGHT RADIUS AND ULNA WITH ROUTINE HEALING, SUBSEQUENT ENCOUNTER: Primary | ICD-10-CM

## 2021-06-15 PROCEDURE — 99024 POSTOP FOLLOW-UP VISIT: CPT | Performed by: ORTHOPAEDIC SURGERY

## 2021-06-15 NOTE — PATIENT INSTRUCTIONS
Fitted for a velcro wrist brace  No heavy lifting, pushing, or pulling with the right hand  NO weightbearing  Ice and elevate as needed  Tylenol or Motrin for pain  Follow up in 3 weeks

## 2021-06-15 NOTE — PROGRESS NOTES
ORTHOPEDIC PROGRESS NOTE    6/15/2021    Patient name: Mt Trinidad  : 1985      SUBJECTIVE     Chief Complaint   Patient presents with    Post-Op Check     Right wrist radius ORIF DOS 2021     The patient was seen and examined. DOS/DOI: 2021  Procedure/Injury: ORIF right distal radius     Mt Trinidad is a 39 y.o. female returns doing well she has been using the right wrist and rolling newspapers she minimizes complaints. We discussed surgery and recovery    OBJECTIVE     GEN: A&O, NAD  MS: RUE: CR<2sec, SILT, able to flex/ext fingers    X-rays: stable fixation right wrist    ASSESSMENT     39 y.o. female, approximately 3 weeks post-surgery/injury    PLAN     - Activity: Non-weight bearing right wrist/hand. Okay for active finger motion flexion extension.   We discussed concerns that she was doing too much and I cautioned her about potential for failure fixation and hardware  - Brace: Velcro wrist brace  - Sutures: remove  - Follow up: 3 weeks for repeat exam and x-rays     Electronically signed by:Wilfredo Sandoval MD, 6/15/2021 2:21 PM

## 2021-07-27 ENCOUNTER — OFFICE VISIT (OUTPATIENT)
Dept: ORTHOPEDIC SURGERY | Age: 36
End: 2021-07-27

## 2021-07-27 VITALS — BODY MASS INDEX: 50.02 KG/M2 | RESPIRATION RATE: 14 BRPM | HEIGHT: 64 IN | WEIGHT: 293 LBS

## 2021-07-27 DIAGNOSIS — S52.501D CLOSED FRACTURE OF DISTAL ENDS OF RIGHT RADIUS AND ULNA WITH ROUTINE HEALING, SUBSEQUENT ENCOUNTER: Primary | ICD-10-CM

## 2021-07-27 DIAGNOSIS — S52.601D CLOSED FRACTURE OF DISTAL ENDS OF RIGHT RADIUS AND ULNA WITH ROUTINE HEALING, SUBSEQUENT ENCOUNTER: Primary | ICD-10-CM

## 2021-07-27 PROCEDURE — 99024 POSTOP FOLLOW-UP VISIT: CPT | Performed by: ORTHOPAEDIC SURGERY

## 2021-07-27 NOTE — PATIENT INSTRUCTIONS
OT ordered  Work on ROM and strengthening exercises per OT protocol  Rest, ice, and elevate  Weightbearing as tolerated  Follow up as needed

## 2021-07-27 NOTE — PROGRESS NOTES
ORTHOPEDIC PROGRESS NOTE    2021    Patient name: Sam Bowman  : 1985      SUBJECTIVE     Chief Complaint   Patient presents with    Post-Op Check     Right wrist ORIF DOS 2021     The patient was seen and examined. DOS/DOI: 2021  Procedure/Injury: ORIF right distal radius     Sam Bowman is a 39 y.o. female follows up approximately 8 weeks postop doing well minimizes complaints. She does desire to do physical therapy. We discussed therapy and recovery realistic expectations. All questions answered    OBJECTIVE     GEN: A&O, NAD  MS: RUE: CR<2sec, SILT, able to flex/ext fingers, incision well-healed, 10 degrees short of full supination pronation, wrist extension flexion about 25-30 degrees    X-rays: stable fixation right wrist    ASSESSMENT     39 y.o. female, ORIF right distal radius May 27, 2021    PLAN     - Activity: Non-weight bearing right wrist/hand. Okay for active finger motion flexion extension.   We discussed concerns that she was doing too much and I cautioned her about potential for failure fixation and hardware  - Brace: Velcro wrist brace as needed  -Physical therapy would be beneficial for increasing and maximizing range of motion and strength  - Follow up: As needed    Electronically signed by:Wilfredo Zhang MD, 2021 3:32 PM

## 2021-08-09 ENCOUNTER — HOSPITAL ENCOUNTER (OUTPATIENT)
Dept: OCCUPATIONAL THERAPY | Age: 36
Setting detail: THERAPIES SERIES
Discharge: HOME | End: 2021-08-09
Payer: MEDICAID

## 2021-08-09 PROCEDURE — 97530 THERAPEUTIC ACTIVITIES: CPT

## 2021-08-09 PROCEDURE — 97110 THERAPEUTIC EXERCISES: CPT

## 2021-08-09 PROCEDURE — 97166 OT EVAL MOD COMPLEX 45 MIN: CPT

## 2021-08-09 NOTE — PLAN OF CARE
[x][facility] [place] [address]      ELISE LESLIE [address]       [facility], [address], [address] [facility]       [address]     [phone] [phone] ZKJ:[phone]  ________________________________________________________________________    Physician:    Dr Malaika Garza From: [facility]  Patient: Lida Sanchez      : 1985  Diagnosis:   R distal radial and ulna fx with ORIF  Physician ICD 10 Code: S52.501D   Treatment Diagnosis:   R wrist stiffness  ICD10 tx code: Y07.609  Date: 2021     MRN: [id number]     Occupational Therapy Certification/Re-Certification Form  Dear Dr. Radha Juan  The following patient has been evaluated for occupational therapy services and for therapy to continue, insurance requires physician review of the treatment plan initially and every 90 days. Please review the attached evaluation and/or summary of the patient's plan of care, and verify that you agree therapy should continue by signing the attached document and sending it back to our office. Plan of Care/Treatment to date:  [x] Therapeutic Exercise   [x] Modalities:  [x] Therapeutic Activity    [] Ultrasound [] Elec Stimulation   [] Total Motion Release    [] Fluido [] Kinesiotaping  [] Neuromuscular Re-education   [] Ionto [] Coldpack/hotpack   [x] Instruction in HEP    Other:  [x] Manual Therapy     [x]scar management   [] Aquatic Therapy     [x] ? edema control      Frequency/Duration:  # Days per week: [] 1 day # Weeks: [] 1 week [] 5 weeks     [x] 2 days?    [] 2 weeks [x] 6 weeks     [] 3 days   [] 3 weeks [] 7 weeks     [] 4 days   [] 4 weeks [] 8 weeks         [] 9 weeks [] 10 weeks         [] 11 weeks [] 12 weeks    Rehab Potential/Progress: [] excellent [x] good [] fair  [] poor       Goals:  Goals   Pt will increase AROM R wrist to [facility] to increase functional mobility   Pt will increase R  10-20# to increase functional grasp   Pt will follow thru and be independent with HEP   Scar management                    Edema control     Pt will increase AROM R wrist to WNL to return to PLOF   Pt will increase R  20-30# to increase functional grasp for daily activities   Pt will decrease quick dash below 25 for significant performance improvement. Electronically signed by:  Quiana Caraballo OT,OTR/L CHT, 8/9/2021, 4:31 PM    If you have any questions or concerns, please don't hesitate to call.   Thank you for your referral.      Physician Signature:__________________   Date:_____________ Time: _______  By signing above, therapists plan is approved by physician

## 2021-08-09 NOTE — FLOWSHEET NOTE
Occupational Therapy Out Patient Daily Treatment Note     [x][place] [address]      ELISE [facility]     [address] [facility] [address] [facility] [address]       [facility], [address], [address]       A[address]     [phone]  [phone] [phone]  ______________________________________________________________________  Date:  2021  Patient Name:  Caryl Arambula    :  1985  Restrictions/Precautions: General   Diagnosis:    ORIF distal radial and ulna fx  Treatment Diagnosis:   R wrist stiffness  Insurance/Certification information:  Ori Ledezma  Referring Physician:   Dr Mariana Monique of care signed (Y/N):    Visit# / total visits:   COVID screening questions were asked and patient attested that there had been no contact or symptoms     Pain level: 1-2/10  A stiffness discomfort    Subjective:   Prior Level of Function:  Full functional use before injury  Patient Goals: Return to PLOF    Treatment Flowsheet   Right Left     See eval                                                                                                                                     Interventions/Modalities used:  [x] Therapeutic Exercise   [x] Modalities: prn  [x] Therapeutic Activity    [] Ultrasound [] Elec Stimulation   [] Total Motion Release    [] Fluido [] Kinesiotaping  [] Neuromuscular Re-education   [] Ionto [x] Coldpack/hotpack   [x] Instruction in HEP    Other: scar management and edema control    Objective Findings: See eval    Communication with other providers: POC to physician    Education provided to patient: Issued and instructed in HEP.  Issued soft putty and instructions    Adverse Reactions to treatment: none noted    Time in:  850  Time out:  945  Timed treatment minutes:  40  Total treatment time:  55     If BWC Please Indicate Time In/Out  CPT Code Time In Time Out Total Min Treatment/Activity Tolerance:     []  Patient tolerated treatment well []  Patient limited by fatique    []  Patient limited by pain []  Patient limited by other medical complications   []  Other:   Goals   Pt will increase AROM R wrist to [facility] to increase functional mobility   Pt will increase R  10-20# to increase functional grasp   Pt will follow thru and be independent with HEP   Scar management                    Edema control     Pt will increase AROM R wrist to WNL to return to PLOF   Pt will increase R  20-30# to increase functional grasp for daily activities   Pt will decrease quick dash below 25 for significant performance improvement.       Patient Requires Follow-up:  [x]  Yes  []  No    Plan: []  Continue per plan of care []  Alter current plan (see comments)   [x]  Plan of care initiated []  Hold pending MD visit []  Discharge    Plan for Next Session:      Electronically signed by:  José Luis Vences OT, OTR/L, 8/9/2021, 4:35 PM

## 2021-08-11 ENCOUNTER — HOSPITAL ENCOUNTER (OUTPATIENT)
Dept: OCCUPATIONAL THERAPY | Age: 36
Setting detail: THERAPIES SERIES
Discharge: HOME | End: 2021-08-11
Payer: MEDICAID

## 2021-08-11 PROCEDURE — 97530 THERAPEUTIC ACTIVITIES: CPT

## 2021-08-11 PROCEDURE — 97110 THERAPEUTIC EXERCISES: CPT

## 2021-08-11 NOTE — FLOWSHEET NOTE
Occupational Therapy Out Patient Daily Treatment Note     [x][place] [address]      ELISE [facility]     [address] [facility] [address]       [facility], [address], [address]       A[address]     [phone]  [phone] [phone]  ______________________________________________________________________  Date:  2021  Patient Name:  Manny Wall    :  1985  Restrictions/Precautions: General   Diagnosis:    ORIF distal radial and ulna fx  Treatment Diagnosis:   R wrist stiffness  Insurance/Certification information:  Jairo Correia  Referring Physician:   Dr Kiersten Sykes of care signed (Y/N):    Visit# / total visits:   COVID screening questions were asked and patient attested that there had been no contact or symptoms     Pain level: 1-2/10  A stiffness discomfort    Subjective: States has been doing alright with exercises. Prior Level of Function:  Full functional use before injury  Patient Goals: Return to PLOF    Treatment Flowsheet   Right Left   HP x    AROM R wrist x    PROM R wrist x    Tendon gliding x    digiflex 3# x    Pinch pin 2# X    Wrist flex and ext 1# x    Sup/pron 8oz hammer x    Scar massage x                                                                            Interventions/Modalities used:  [x] Therapeutic Exercise   [x] Modalities: prn  [x] Therapeutic Activity    [] Ultrasound [] Elec Stimulation   [] Total Motion Release    [] Fluido [] Kinesiotaping  [] Neuromuscular Re-education   [] Ionto [x] Coldpack/hotpack   [x] Instruction in HEP    Other: scar management and edema control    Objective Findings: R  22.0#.  Supination 65, wrist flex 45 ext 58-improved    Communication with other providers: POC to physician    Education provided to patient: Issued and instructed in HEP.  Issued soft putty and instructions    Adverse Reactions to treatment: none noted    Time in:  1020  Time out: 1100  Timed treatment minutes:  40  Total treatment time:  40     If BWC Please Indicate Time In/Out  CPT Code Time In Time Out Total Min                                                                    Treatment/Activity Tolerance:     []  Patient tolerated treatment well []  Patient limited by fatique    []  Patient limited by pain []  Patient limited by other medical complications   []  Other:   Goals   Pt will increase AROM R wrist to [facility] to increase functional mobility   Pt will increase R  10-20# to increase functional grasp   Pt will follow thru and be independent with HEP   Scar management                    Edema control     Pt will increase AROM R wrist to WNL to return to PLOF   Pt will increase R  20-30# to increase functional grasp for daily activities   Pt will decrease quick dash below 25 for significant performance improvement.       Patient Requires Follow-up:  [x]  Yes  []  No    Plan: []  Continue per plan of care []  Alter current plan (see comments)   [x]  Plan of care initiated []  Hold pending MD visit []  Discharge    Plan for Next Session:      Electronically signed by:  Kit Dhaliwal OT,OTR/L, 8/11/2021, 11:04 AM

## 2021-08-18 ENCOUNTER — HOSPITAL ENCOUNTER (OUTPATIENT)
Dept: OCCUPATIONAL THERAPY | Age: 36
Discharge: HOME | End: 2021-08-18

## 2021-08-18 NOTE — FLOWSHEET NOTE
Occupational Therapy  Cancellation/No-show Note  Patient Name:  Glenys Ricardo  :  1985   Date:  2021  Cancelled visits to date: 0  No-shows to date: 2    For today's appointment patient:  []    Cancelled  []    Rescheduled appointment  [x]    No-show     Reason given by patient:  []    Patient ill  []    Conflicting appointment  []    No transportation    []    Conflict with work  []    No reason given  []    Other:     Comments:  Called and had forgotten time for appt.     Electronically signed by:  Jose Fritz OT, OTR/L, 2021, 10:10 AM

## 2021-08-23 ENCOUNTER — HOSPITAL ENCOUNTER (OUTPATIENT)
Dept: OCCUPATIONAL THERAPY | Age: 36
Setting detail: THERAPIES SERIES
Discharge: HOME | End: 2021-08-23
Payer: MEDICAID

## 2021-08-23 PROCEDURE — 97110 THERAPEUTIC EXERCISES: CPT

## 2021-08-23 PROCEDURE — 97140 MANUAL THERAPY 1/> REGIONS: CPT

## 2021-08-23 NOTE — FLOWSHEET NOTE
Occupational Therapy Out Patient Daily Treatment Note     [x][place] [address]      [facility]     [address] [facility] [address]       [facility] [address], [address], [address]       [address]     [phone]  [phone] [phone]  ______________________________________________________________________  Date:  2021  Patient Name:  Chris Pat    :  1985  Restrictions/Precautions: General   Diagnosis:    ORIF distal radial and ulna fx  Treatment Diagnosis:   R wrist stiffness  Insurance/Certification information:  [facility]  Referring Physician:   Dr Ignacio Chapa of care signed (Y/N):    Visit# / total visits: 3 /12  COVID screening questions were asked and patient attested that there had been no contact or symptoms     Pain level: 1-2/10  A stiffness discomfort    Subjective: States missed last week because it was hectic with kids starting school. Prior Level of Function:  Full functional use before injury  Patient Goals: Return to PLOF    Treatment Flowsheet   Right Left   HP x    AROM R wrist x    PROM R wrist x    Tendon gliding x    digiflex 3# x    Pinch pin 2# X    Wrist flex and ext 1# x    Sup/pron 8oz hammer x    Scar massage x                                                                            Interventions/Modalities used:  [x] Therapeutic Exercise   [x] Modalities: prn  [x] Therapeutic Activity    [] Ultrasound [] Elec Stimulation   [] Total Motion Release    [] Fluido [] Kinesiotaping  [] Neuromuscular Re-education   [] Ionto [x] Coldpack/hotpack   [x] Instruction in HEP    Other: scar management and edema control    Objective Findings: R  32.8#.  Supination 70, wrist flex 65 ext 65-improved    Communication with other providers: POC to physician    Education provided to patient: Issued and instructed in HEP.  Issued soft putty and instructions    Adverse Reactions to

## 2021-08-25 ENCOUNTER — HOSPITAL ENCOUNTER (OUTPATIENT)
Dept: OCCUPATIONAL THERAPY | Age: 36
Discharge: HOME | End: 2021-08-25

## 2021-08-25 NOTE — FLOWSHEET NOTE
Occupational Therapy  Cancellation/No-show Note  Patient Name:  Lissy Elizabeth  :  1985   Date:  2021  Cancelled visits to date: 0  No-shows to date: 2    For today's appointment patient:  [x]    Cancelled  []    Rescheduled appointment  []    No-show     Reason given by patient:  [x]    Patient ill  []    Conflicting appointment  []    No transportation    []    Conflict with work  []    No reason given  []    Other:     Comments:    Electronically signed by:  Mary Burrell OT, OTR/L, 2021, 10:04 AM

## 2021-09-12 ENCOUNTER — APPOINTMENT (OUTPATIENT)
Dept: GENERAL RADIOLOGY | Age: 36
End: 2021-09-12
Payer: MEDICAID

## 2021-09-12 ENCOUNTER — HOSPITAL ENCOUNTER (EMERGENCY)
Age: 36
Discharge: HOME | End: 2021-09-12
Attending: EMERGENCY MEDICINE
Payer: MEDICAID

## 2021-09-12 VITALS
RESPIRATION RATE: 18 BRPM | HEIGHT: 64 IN | SYSTOLIC BLOOD PRESSURE: 128 MMHG | WEIGHT: 293 LBS | HEART RATE: 80 BPM | BODY MASS INDEX: 50.02 KG/M2 | TEMPERATURE: 98.1 F | OXYGEN SATURATION: 93 % | DIASTOLIC BLOOD PRESSURE: 82 MMHG

## 2021-09-12 DIAGNOSIS — M25.531 RIGHT WRIST PAIN: Primary | ICD-10-CM

## 2021-09-12 DIAGNOSIS — S63.501A SPRAIN OF RIGHT WRIST, INITIAL ENCOUNTER: ICD-10-CM

## 2021-09-12 PROCEDURE — 99285 EMERGENCY DEPT VISIT HI MDM: CPT

## 2021-09-12 PROCEDURE — 73110 X-RAY EXAM OF WRIST: CPT

## 2021-09-12 PROCEDURE — 73130 X-RAY EXAM OF HAND: CPT

## 2021-09-12 PROCEDURE — 6370000000 HC RX 637 (ALT 250 FOR IP): Performed by: EMERGENCY MEDICINE

## 2021-09-12 RX ORDER — NAPROXEN 250 MG/1
500 TABLET ORAL ONCE
Status: COMPLETED | OUTPATIENT
Start: 2021-09-12 | End: 2021-09-12

## 2021-09-12 RX ORDER — ACETAMINOPHEN 500 MG
1000 TABLET ORAL ONCE
Status: COMPLETED | OUTPATIENT
Start: 2021-09-12 | End: 2021-09-12

## 2021-09-12 RX ORDER — ACETAMINOPHEN 325 MG/1
650 TABLET ORAL EVERY 6 HOURS PRN
Qty: 120 TABLET | Refills: 3 | Status: SHIPPED | OUTPATIENT
Start: 2021-09-12

## 2021-09-12 RX ORDER — NAPROXEN 500 MG/1
500 TABLET ORAL 2 TIMES DAILY
Qty: 60 TABLET | Refills: 0 | Status: SHIPPED | OUTPATIENT
Start: 2021-09-12

## 2021-09-12 RX ADMIN — ACETAMINOPHEN 1000 MG: 500 TABLET, FILM COATED ORAL at 08:41

## 2021-09-12 RX ADMIN — NAPROXEN 500 MG: 250 TABLET ORAL at 08:41

## 2021-09-12 ASSESSMENT — ENCOUNTER SYMPTOMS
GASTROINTESTINAL NEGATIVE: 1
EYES NEGATIVE: 1
ALLERGIC/IMMUNOLOGIC NEGATIVE: 1
RESPIRATORY NEGATIVE: 1

## 2021-09-12 ASSESSMENT — PAIN SCALES - GENERAL
PAINLEVEL_OUTOF10: 4
PAINLEVEL_OUTOF10: 5

## 2021-09-12 ASSESSMENT — PAIN DESCRIPTION - ORIENTATION: ORIENTATION: RIGHT

## 2021-09-12 ASSESSMENT — PAIN DESCRIPTION - LOCATION: LOCATION: WRIST

## 2021-09-12 ASSESSMENT — PAIN DESCRIPTION - PAIN TYPE: TYPE: ACUTE PAIN
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
## 2021-09-21 NOTE — DISCHARGE SUMMARY
Outpatient Physical Therapy  [place]           [x] Phone: [phone]   Fax: [phone]  Joan Arcos           [] Phone: [phone]   Fax: [phone]        Occupational Therapy Daily Discharge Note  Date:  2021    Patient Name:  Sergei William    :  1985  MRN: [id number]    Patient Name:  Sergei William                :  1985  Restrictions/Precautions: General   Diagnosis:    ORIF distal radial and ulna fx  Treatment Diagnosis:   R wrist stiffness  Insurance/Certification information:  [place]  Referring Physician:   Dr Hyla Cushing of care signed (Y/N):    Visit# / total visits: 3 /12       Objective:    Unable to complete an assessment of the patient and their progress towards their goals secondary to discontinuation of therapy. Sergei William last appointment was on 2021  Cancelled for illness 2021      Communication with other providers:    Faxed Discharge note secondary to discontinuation of therapy sevices      Assessment:    Sergei William has discontinued therapy services and at this time they will be discharged from our facility. If there is any future needs please don't hesitate to call our offices and resubmit a new therapy order. We appreciate your referral and letting us serve your patients.        Interventions PRN:  [x] Therapeutic Exercise  [] Modalities:  [x] Therapeutic Activity     [] Ultrasound  [] Estim  [] ADL        [] Neuromuscular Re-education    [x] Cold/hotpack [] Iontophoresis   [x] Instruction in HEP      [] Other           [] Fluidotherapy    [] Manual Therapy                             Electronically signed by:    Chris Stockton OT,    ,  2021, 10:05 AM

## 2021-09-26 ENCOUNTER — HOSPITAL ENCOUNTER (EMERGENCY)
Age: 36
Discharge: HOME | End: 2021-09-26
Payer: MEDICAID

## 2021-09-26 VITALS
DIASTOLIC BLOOD PRESSURE: 93 MMHG | HEIGHT: 64 IN | OXYGEN SATURATION: 95 % | RESPIRATION RATE: 18 BRPM | HEART RATE: 104 BPM | WEIGHT: 293 LBS | TEMPERATURE: 100 F | SYSTOLIC BLOOD PRESSURE: 133 MMHG | BODY MASS INDEX: 50.02 KG/M2

## 2021-09-26 DIAGNOSIS — N39.0 URINARY TRACT INFECTION WITHOUT HEMATURIA, SITE UNSPECIFIED: Primary | ICD-10-CM

## 2021-09-26 LAB
BACTERIA: ABNORMAL /HPF
BILIRUBIN URINE: NEGATIVE MG/DL
BLOOD, URINE: ABNORMAL
CLARITY: ABNORMAL
COLOR: YELLOW
GLUCOSE, URINE: NEGATIVE MG/DL
KETONES, URINE: NEGATIVE MG/DL
LEUKOCYTE ESTERASE, URINE: ABNORMAL
MUCUS: ABNORMAL HPF
NITRITE URINE, QUANTITATIVE: NEGATIVE
PH, URINE: 5 (ref 5–8)
PROTEIN UA: NEGATIVE MG/DL
RBC URINE: 4 /HPF (ref 0–6)
SPECIFIC GRAVITY UA: 1.01 (ref 1–1.03)
SQUAMOUS EPITHELIAL: 8 /HPF
TRANSITIONAL EPITHELIAL: 1 /HPF
TRICHOMONAS: ABNORMAL /HPF
UROBILINOGEN, URINE: NEGATIVE MG/DL (ref 0.2–1)
WBC UA: 41 /HPF (ref 0–5)

## 2021-09-26 PROCEDURE — 87186 SC STD MICRODIL/AGAR DIL: CPT

## 2021-09-26 PROCEDURE — 87086 URINE CULTURE/COLONY COUNT: CPT

## 2021-09-26 PROCEDURE — 6370000000 HC RX 637 (ALT 250 FOR IP): Performed by: PHYSICIAN ASSISTANT

## 2021-09-26 PROCEDURE — 99284 EMERGENCY DEPT VISIT MOD MDM: CPT

## 2021-09-26 PROCEDURE — 87088 URINE BACTERIA CULTURE: CPT

## 2021-09-26 PROCEDURE — 81001 URINALYSIS AUTO W/SCOPE: CPT

## 2021-09-26 RX ORDER — CEPHALEXIN 500 MG/1
500 CAPSULE ORAL 4 TIMES DAILY
Qty: 28 CAPSULE | Refills: 0 | Status: SHIPPED | OUTPATIENT
Start: 2021-09-26 | End: 2021-10-03

## 2021-09-26 RX ORDER — CEPHALEXIN 250 MG/1
500 CAPSULE ORAL ONCE
Status: COMPLETED | OUTPATIENT
Start: 2021-09-26 | End: 2021-09-26

## 2021-09-26 RX ADMIN — CEPHALEXIN 500 MG: 250 CAPSULE ORAL at 21:23

## 2021-09-26 ASSESSMENT — PAIN SCALES - GENERAL: PAINLEVEL_OUTOF10: 7

## 2021-09-26 NOTE — ED NOTES
Patient reports to ED with complaints of R flank pain radiating to LLQ of the abdomen, for the last 48 hours. Patient rates pain 6/10 on the pain scale. Patient reports new onset urinary incontinence with onset of symptoms. Patient denies any other concerns or complaints at this time.       Tam Campos RN  09/26/21 0683

## 2021-09-27 NOTE — ED NOTES
Discharge instructions explained by provider. Patient verbalized understanding and denies any other concerns or complaints at this time. Patient A&Ox4 and deemed clinically stable to return home. Patient d/c to lobby to await transportation home.       Angela Abarca RN  09/26/21 7027
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
## 2021-09-29 LAB
CULTURE: ABNORMAL
CULTURE: ABNORMAL
Lab: ABNORMAL
SPECIMEN: ABNORMAL

## 2021-11-15 ENCOUNTER — CLINICAL DOCUMENTATION (OUTPATIENT)
Dept: OTHER | Age: 36
End: 2021-11-15

## 2024-06-25 ENCOUNTER — HOSPITAL ENCOUNTER (EMERGENCY)
Age: 39
Discharge: HOME | End: 2024-06-25
Payer: MEDICAID

## 2024-06-25 VITALS
TEMPERATURE: 98.4 F | HEART RATE: 92 BPM | SYSTOLIC BLOOD PRESSURE: 122 MMHG | OXYGEN SATURATION: 99 % | RESPIRATION RATE: 16 BRPM | DIASTOLIC BLOOD PRESSURE: 86 MMHG

## 2024-06-25 DIAGNOSIS — J06.9 VIRAL URI WITH COUGH: Primary | ICD-10-CM

## 2024-06-25 PROCEDURE — 99283 EMERGENCY DEPT VISIT LOW MDM: CPT

## 2024-06-25 RX ORDER — DEXTROMETHORPHAN HYDROBROMIDE, GUAIFENESIN AND PSEUDOEPHEDRINE HYDROCHLORIDE 15; 400; 60 MG/1; MG/1; MG/1
1 TABLET ORAL EVERY 6 HOURS
Qty: 20 TABLET | Refills: 0 | Status: SHIPPED | OUTPATIENT
Start: 2024-06-25 | End: 2024-06-30

## 2024-06-25 RX ORDER — FLUTICASONE PROPIONATE 50 MCG
2 SPRAY, SUSPENSION (ML) NASAL DAILY
Qty: 16 G | Refills: 0 | Status: SHIPPED | OUTPATIENT
Start: 2024-06-25

## 2024-06-25 ASSESSMENT — PAIN - FUNCTIONAL ASSESSMENT
PAIN_FUNCTIONAL_ASSESSMENT: 0-10
PAIN_FUNCTIONAL_ASSESSMENT: 0-10

## 2024-06-25 ASSESSMENT — PAIN SCALES - GENERAL
PAINLEVEL_OUTOF10: 4
PAINLEVEL_OUTOF10: 6

## 2024-06-25 ASSESSMENT — PAIN DESCRIPTION - LOCATION: LOCATION: CHEST
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
## 2024-09-23 ENCOUNTER — HOSPITAL ENCOUNTER (EMERGENCY)
Age: 39
Discharge: HOME | End: 2024-09-23
Attending: EMERGENCY MEDICINE
Payer: MEDICAID

## 2024-09-23 VITALS
HEIGHT: 64 IN | DIASTOLIC BLOOD PRESSURE: 70 MMHG | WEIGHT: 293 LBS | RESPIRATION RATE: 18 BRPM | BODY MASS INDEX: 50.02 KG/M2 | TEMPERATURE: 98.1 F | OXYGEN SATURATION: 99 % | SYSTOLIC BLOOD PRESSURE: 122 MMHG | HEART RATE: 88 BPM

## 2024-09-23 DIAGNOSIS — F19.920 DRUG INTOXICATION WITHOUT COMPLICATION (HCC): Primary | ICD-10-CM

## 2024-09-23 DIAGNOSIS — T74.21XA SEXUAL ASSAULT OF ADULT, INITIAL ENCOUNTER: ICD-10-CM

## 2024-09-23 LAB
AMPHET UR QL SCN: POSITIVE
BARBITURATES UR QL SCN: NEGATIVE
BENZODIAZ UR QL: NEGATIVE
CANNABINOIDS UR QL SCN: NEGATIVE
COCAINE UR QL SCN: NEGATIVE
ETHANOLAMINE SERPL-MCNC: <10 MG/DL (ref 0–0.08)
FENTANYL UR QL: NEGATIVE
OPIATES UR QL SCN: NEGATIVE
OXYCODONE UR QL SCN: NEGATIVE
TEST INFORMATION: ABNORMAL

## 2024-09-23 PROCEDURE — 80307 DRUG TEST PRSMV CHEM ANLYZR: CPT

## 2024-09-23 PROCEDURE — 2720000011 HC SANE KIT SUPPLY STERILE

## 2024-09-23 PROCEDURE — G0480 DRUG TEST DEF 1-7 CLASSES: HCPCS

## 2024-09-23 PROCEDURE — 99283 EMERGENCY DEPT VISIT LOW MDM: CPT

## 2024-09-23 ASSESSMENT — PAIN - FUNCTIONAL ASSESSMENT: PAIN_FUNCTIONAL_ASSESSMENT: NONE - DENIES PAIN

## (undated) DEVICE — 3M™ STERI-DRAPE™ U-DRAPE 1015: Brand: STERI-DRAPE™

## (undated) DEVICE — DRAPE,U/ SHT,SPLIT,PLAS,STERIL: Brand: MEDLINE

## (undated) DEVICE — APPLICATOR MEDICATED 26 CC SOLUTION HI LT ORNG CHLORAPREP

## (undated) DEVICE — BANDAGE,ELASTIC,ESMARK,STERILE,4"X9',LF: Brand: MEDLINE

## (undated) DEVICE — COTTON UNDERCAST PADDING,CRIMPED FINISH: Brand: WEBRIL

## (undated) DEVICE — GAUZE,SPONGE,4"X4",16PLY,XRAY,STRL,LF: Brand: MEDLINE

## (undated) DEVICE — SOLUTION IV IRRIG WATER 1000ML POUR BRL 2F7114

## (undated) DEVICE — BANDAGE,SELF ADHRNT,COFLEX,4"X5YD,STRL: Brand: COLABEL

## (undated) DEVICE — COUNTER NDL 30 COUNT FOAM STRP SGL MAG

## (undated) DEVICE — FORCEPS BX L240CM JAW DIA2.8MM L CAP W/ NDL MIC MESH TOOTH

## (undated) DEVICE — LINER SUCT CANSTR 1500CC SEMI RIG W/ POR HYDROPHOBIC SHUT

## (undated) DEVICE — DEFENDO AIR WATER SUCTION AND BIOPSY VALVE KIT FOR  OLYMPUS: Brand: DEFENDO AIR/WATER/SUCTION AND BIOPSY VALVE

## (undated) DEVICE — SOLUTION IV IRRIG POUR BRL 0.9% SODIUM CHL 2F7124

## (undated) DEVICE — BIT DRL L110MM DIA1.8MM QUIK CPL CALIB W/O STP REUSE

## (undated) DEVICE — Z INACTIVE USE 2735373 APPLICATOR FBR LAIN COT WOOD TIP ECONOMICAL

## (undated) DEVICE — CLIP INT L235CM WRK CHN DIA2.8MM OPN 11MM BRAID CATH ROT BX/10

## (undated) DEVICE — BW-412T DISP COMBO CLEANING BRUSH: Brand: SINGLE USE COMBINATION CLEANING BRUSH

## (undated) DEVICE — PADDING,UNDERCAST,COTTON, 4"X4YD STERILE: Brand: MEDLINE

## (undated) DEVICE — ZIMMER® STERILE DISPOSABLE TOURNIQUET CUFF WITH PLC, DUAL PORT, SINGLE BLADDER, 24 IN. (61 CM)

## (undated) DEVICE — C-ARM: Brand: UNBRANDED

## (undated) DEVICE — SPONGE GZ W4XL8IN COT WVN 12 PLY

## (undated) DEVICE — MARKER SURG SKIN UTIL REGULAR/FINE 2 TIP W/ RUL AND 9 LBL

## (undated) DEVICE — PENCIL ES CRD L10FT HND SWCHING ROCK SWCH W/ EDGE COAT BLDE

## (undated) DEVICE — TOWEL,OR,DSP,ST,BLUE,STD,6/PK,12PK/CS: Brand: MEDLINE

## (undated) DEVICE — PAD,ABDOMINAL,5"X9",ST,LF,25/BX: Brand: MEDLINE INDUSTRIES, INC.

## (undated) DEVICE — KENDALL 500 SERIES DIAPHORETIC FOAM MONITORING ELECTRODE - TEAR DROP SHAPE ( 30/PK): Brand: KENDALL

## (undated) DEVICE — SYRINGE IRRIG 60ML SFT PLIABLE BLB EZ TO GRP 1 HND USE W/

## (undated) DEVICE — TUBING, SUCTION, 9/32" X 10', STRAIGHT: Brand: MEDLINE

## (undated) DEVICE — TUBING, SUCTION, 3/16" X 6', STRAIGHT: Brand: MEDLINE

## (undated) DEVICE — SUTURE ETHLN SZ 3-0 L18IN NONABSORBABLE BLK FS-1 L24MM 3/8 663H

## (undated) DEVICE — LINE SAMP O2 6.5FT W/FEMALE CONN F/ADULT CAPNOLINE PLUS

## (undated) DEVICE — INTENDED FOR TISSUE SEPARATION, AND OTHER PROCEDURES THAT REQUIRE A SHARP SURGICAL BLADE TO PUNCTURE OR CUT.: Brand: BARD-PARKER ® STAINLESS STEEL BLADES

## (undated) DEVICE — YANKAUER,FLEXIBLE HANDLE,REGLR CAPACITY: Brand: MEDLINE INDUSTRIES, INC.

## (undated) DEVICE — TUBING SUCT 12FR MAL ALUM SHFT FN CAP VENT UNIV CONN W/ OBT

## (undated) DEVICE — CHLORAPREP 26ML ORANGE

## (undated) DEVICE — BIT DRL L100MM DIA2MM QUIK CPL W/O STP REUSE

## (undated) DEVICE — JELLY LUBRICATING 3 GM BACTERIOSTATIC

## (undated) DEVICE — ALCOHOL RUBBING ISO 16OZ 70%

## (undated) DEVICE — ELECTRODE ES AD CRDLSS PT RET REM POLYHESIVE

## (undated) DEVICE — DRESSING,GAUZE,XEROFORM,CURAD,1"X8",ST: Brand: CURAD

## (undated) DEVICE — ZIMMER® STERILE DISPOSABLE TOURNIQUET CUFF WITH PLC, DUAL PORT, SINGLE BLADDER, 18 IN. (46 CM)